# Patient Record
Sex: FEMALE | Race: BLACK OR AFRICAN AMERICAN | NOT HISPANIC OR LATINO
[De-identification: names, ages, dates, MRNs, and addresses within clinical notes are randomized per-mention and may not be internally consistent; named-entity substitution may affect disease eponyms.]

---

## 2017-01-06 ENCOUNTER — APPOINTMENT (OUTPATIENT)
Dept: OPHTHALMOLOGY | Facility: CLINIC | Age: 27
End: 2017-01-06

## 2017-03-16 ENCOUNTER — APPOINTMENT (OUTPATIENT)
Dept: OPHTHALMOLOGY | Facility: CLINIC | Age: 27
End: 2017-03-16

## 2017-06-15 ENCOUNTER — APPOINTMENT (OUTPATIENT)
Dept: OPHTHALMOLOGY | Facility: CLINIC | Age: 27
End: 2017-06-15

## 2017-07-20 ENCOUNTER — APPOINTMENT (OUTPATIENT)
Dept: OBGYN | Facility: CLINIC | Age: 27
End: 2017-07-20

## 2017-07-20 VITALS
WEIGHT: 212 LBS | SYSTOLIC BLOOD PRESSURE: 139 MMHG | BODY MASS INDEX: 39.01 KG/M2 | HEART RATE: 77 BPM | DIASTOLIC BLOOD PRESSURE: 85 MMHG | HEIGHT: 62 IN

## 2017-07-24 LAB
C TRACH RRNA SPEC QL NAA+PROBE: NORMAL
N GONORRHOEA RRNA SPEC QL NAA+PROBE: NORMAL
SOURCE AMPLIFICATION: NORMAL

## 2017-08-08 LAB — CYTOLOGY CVX/VAG DOC THIN PREP: NORMAL

## 2017-09-14 ENCOUNTER — ASOB RESULT (OUTPATIENT)
Age: 27
End: 2017-09-14

## 2017-09-14 ENCOUNTER — APPOINTMENT (OUTPATIENT)
Dept: OBGYN | Facility: CLINIC | Age: 27
End: 2017-09-14
Payer: COMMERCIAL

## 2017-09-14 VITALS
HEART RATE: 66 BPM | SYSTOLIC BLOOD PRESSURE: 131 MMHG | WEIGHT: 213.7 LBS | HEIGHT: 62 IN | DIASTOLIC BLOOD PRESSURE: 83 MMHG | BODY MASS INDEX: 39.32 KG/M2

## 2017-09-14 DIAGNOSIS — Z01.419 ENCOUNTER FOR GYNECOLOGICAL EXAMINATION (GENERAL) (ROUTINE) W/OUT ABNORMAL FINDINGS: ICD-10-CM

## 2017-09-14 PROCEDURE — 76817 TRANSVAGINAL US OBSTETRIC: CPT

## 2017-09-14 PROCEDURE — 99213 OFFICE O/P EST LOW 20 MIN: CPT

## 2017-09-15 LAB
ABO + RH PNL BLD: NORMAL
BASOPHILS # BLD AUTO: 0.01 K/UL
BASOPHILS NFR BLD AUTO: 0.2 %
BLD GP AB SCN SERPL QL: NORMAL
CMV IGG SERPL QL: 3.8 U/ML
CMV IGG SERPL-IMP: POSITIVE
CMV IGM SERPL QL: <8 AU/ML
CMV IGM SERPL QL: NEGATIVE
EOSINOPHIL # BLD AUTO: 0 K/UL
EOSINOPHIL NFR BLD AUTO: 0 %
HBV SURFACE AG SER QL: NONREACTIVE
HCT VFR BLD CALC: 34 %
HCV AB SER QL: NONREACTIVE
HCV S/CO RATIO: 0.11 S/CO
HGB BLD-MCNC: 11.1 G/DL
HIV1+2 AB SPEC QL IA.RAPID: NONREACTIVE
IMM GRANULOCYTES NFR BLD AUTO: 0.2 %
LYMPHOCYTES # BLD AUTO: 1.8 K/UL
LYMPHOCYTES NFR BLD AUTO: 33.8 %
MAN DIFF?: NORMAL
MCHC RBC-ENTMCNC: 25.6 PG
MCHC RBC-ENTMCNC: 32.6 GM/DL
MCV RBC AUTO: 78.3 FL
MONOCYTES # BLD AUTO: 0.46 K/UL
MONOCYTES NFR BLD AUTO: 8.6 %
NEUTROPHILS # BLD AUTO: 3.05 K/UL
NEUTROPHILS NFR BLD AUTO: 57.2 %
PLATELET # BLD AUTO: 266 K/UL
RBC # BLD: 4.34 M/UL
RBC # FLD: 13.4 %
RUBV IGG FLD-ACNC: 1.6 INDEX
RUBV IGG SER-IMP: POSITIVE
VZV AB TITR SER: POSITIVE
VZV IGG SER IF-ACNC: 1153 INDEX
WBC # FLD AUTO: 5.33 K/UL

## 2017-09-21 ENCOUNTER — APPOINTMENT (OUTPATIENT)
Dept: OPHTHALMOLOGY | Facility: CLINIC | Age: 27
End: 2017-09-21

## 2017-09-28 LAB
AR GENE MUT ANL BLD/T: NORMAL
B19V IGG SER QL IA: 2.9 INDEX
B19V IGG+IGM SER-IMP: NORMAL
B19V IGG+IGM SER-IMP: POSITIVE
B19V IGM FLD-ACNC: 0.1 INDEX
B19V IGM SER-ACNC: NEGATIVE
BACTERIA UR CULT: NORMAL
CFTR MUT TESTED BLD/T: NORMAL
FMR1 GENE MUT ANL BLD/T: NORMAL
HGB A MFR BLD: 62.9 %
HGB A2 MFR BLD: 3.4 %
HGB F MFR BLD: 1.2 %
HGB FRACT BLD-IMP: NORMAL
HGB S BLD QL SOLY: POSITIVE
HGB S MFR BLD: 32.5 %
LEAD BLD-MCNC: <1 UG/DL
T PALLIDUM AB SER QL IA: NEGATIVE

## 2017-10-17 ENCOUNTER — APPOINTMENT (OUTPATIENT)
Dept: OBGYN | Facility: CLINIC | Age: 27
End: 2017-10-17

## 2017-10-17 ENCOUNTER — APPOINTMENT (OUTPATIENT)
Dept: OBGYN | Facility: CLINIC | Age: 27
End: 2017-10-17
Payer: COMMERCIAL

## 2017-10-17 VITALS
WEIGHT: 211.9 LBS | BODY MASS INDEX: 39 KG/M2 | DIASTOLIC BLOOD PRESSURE: 79 MMHG | HEIGHT: 62 IN | SYSTOLIC BLOOD PRESSURE: 126 MMHG

## 2017-10-17 PROCEDURE — 0501F PRENATAL FLOW SHEET: CPT

## 2017-10-20 ENCOUNTER — APPOINTMENT (OUTPATIENT)
Dept: ANTEPARTUM | Facility: CLINIC | Age: 27
End: 2017-10-20
Payer: COMMERCIAL

## 2017-10-20 ENCOUNTER — ASOB RESULT (OUTPATIENT)
Age: 27
End: 2017-10-20

## 2017-10-20 LAB — BACTERIA UR CULT: NORMAL

## 2017-10-20 PROCEDURE — 76813 OB US NUCHAL MEAS 1 GEST: CPT

## 2017-10-20 PROCEDURE — 76801 OB US < 14 WKS SINGLE FETUS: CPT

## 2017-10-20 PROCEDURE — 36416 COLLJ CAPILLARY BLOOD SPEC: CPT

## 2017-11-21 ENCOUNTER — APPOINTMENT (OUTPATIENT)
Dept: OBGYN | Facility: CLINIC | Age: 27
End: 2017-11-21
Payer: COMMERCIAL

## 2017-11-21 VITALS
SYSTOLIC BLOOD PRESSURE: 124 MMHG | DIASTOLIC BLOOD PRESSURE: 84 MMHG | WEIGHT: 206 LBS | HEIGHT: 62 IN | BODY MASS INDEX: 37.91 KG/M2

## 2017-11-21 LAB
CREAT 24H UR-MCNC: 1.4 G/24 H
CREAT 24H UR-MCNC: 1.4 G/24 H
CREAT ?TM UR-MCNC: 116 MG/DL
CREAT ?TM UR-MCNC: 116 MG/DL
PROT 24H UR-MRATE: 7 MG/DL
PROT ?TM UR-MCNC: 24 HR
PROT ?TM UR-MCNC: 24 HR
PROT UR-MCNC: 88 MG/24 H
SPECIMEN VOL 24H UR: 1250 ML
SPECIMEN VOL 24H UR: 1250 ML

## 2017-11-21 PROCEDURE — 0502F SUBSEQUENT PRENATAL CARE: CPT

## 2017-11-27 LAB
1ST TRIMESTER DATA: NORMAL
2ND TRIMESTER DATA: NORMAL
AFP PNL SERPL: NORMAL
AFP SERPL-ACNC: NORMAL
AFP SERPL-ACNC: NORMAL
B-HCG FREE SERPL-MCNC: NORMAL
BACTERIA UR CULT: NORMAL
CLINICAL BIOCHEMIST REVIEW: NORMAL
FREE BETA HCG 1ST TRIMESTER: NORMAL
INHIBIN A SERPL-MCNC: NORMAL
NOTES NTD: NORMAL
NT: NORMAL
PAPP-A SERPL-ACNC: NORMAL
U ESTRIOL SERPL-SCNC: NORMAL

## 2017-12-05 ENCOUNTER — APPOINTMENT (OUTPATIENT)
Dept: OPHTHALMOLOGY | Facility: CLINIC | Age: 27
End: 2017-12-05
Payer: COMMERCIAL

## 2017-12-05 PROCEDURE — 92083 EXTENDED VISUAL FIELD XM: CPT

## 2017-12-05 PROCEDURE — 92015 DETERMINE REFRACTIVE STATE: CPT

## 2017-12-05 PROCEDURE — 92014 COMPRE OPH EXAM EST PT 1/>: CPT

## 2017-12-08 ENCOUNTER — ASOB RESULT (OUTPATIENT)
Age: 27
End: 2017-12-08

## 2017-12-08 ENCOUNTER — APPOINTMENT (OUTPATIENT)
Dept: ANTEPARTUM | Facility: CLINIC | Age: 27
End: 2017-12-08
Payer: MEDICAID

## 2017-12-08 PROCEDURE — 76811 OB US DETAILED SNGL FETUS: CPT

## 2017-12-20 ENCOUNTER — APPOINTMENT (OUTPATIENT)
Dept: OBGYN | Facility: CLINIC | Age: 27
End: 2017-12-20

## 2017-12-20 VITALS
SYSTOLIC BLOOD PRESSURE: 118 MMHG | WEIGHT: 207 LBS | BODY MASS INDEX: 38.09 KG/M2 | HEIGHT: 62 IN | DIASTOLIC BLOOD PRESSURE: 79 MMHG

## 2017-12-27 LAB — BACTERIA UR CULT: NORMAL

## 2018-01-12 ENCOUNTER — ASOB RESULT (OUTPATIENT)
Age: 28
End: 2018-01-12

## 2018-01-12 ENCOUNTER — APPOINTMENT (OUTPATIENT)
Dept: ANTEPARTUM | Facility: CLINIC | Age: 28
End: 2018-01-12
Payer: MEDICAID

## 2018-01-12 PROCEDURE — 76816 OB US FOLLOW-UP PER FETUS: CPT

## 2018-01-25 ENCOUNTER — APPOINTMENT (OUTPATIENT)
Dept: OBGYN | Facility: CLINIC | Age: 28
End: 2018-01-25
Payer: MEDICAID

## 2018-01-25 VITALS
DIASTOLIC BLOOD PRESSURE: 72 MMHG | BODY MASS INDEX: 39.09 KG/M2 | HEIGHT: 62 IN | WEIGHT: 212.44 LBS | SYSTOLIC BLOOD PRESSURE: 118 MMHG

## 2018-01-25 PROCEDURE — 0502F SUBSEQUENT PRENATAL CARE: CPT

## 2018-01-27 LAB
BACTERIA UR CULT: NORMAL
BASOPHILS # BLD AUTO: 0.01 K/UL
BASOPHILS NFR BLD AUTO: 0.1 %
EOSINOPHIL # BLD AUTO: 0.01 K/UL
EOSINOPHIL NFR BLD AUTO: 0.1 %
GLUCOSE 1H P 50 G GLC PO SERPL-MCNC: 102 MG/DL
HCT VFR BLD CALC: 33.1 %
HGB BLD-MCNC: 10.3 G/DL
IMM GRANULOCYTES NFR BLD AUTO: 0.3 %
LYMPHOCYTES # BLD AUTO: 1.46 K/UL
LYMPHOCYTES NFR BLD AUTO: 19 %
MAN DIFF?: NORMAL
MCHC RBC-ENTMCNC: 25.6 PG
MCHC RBC-ENTMCNC: 31.1 GM/DL
MCV RBC AUTO: 82.1 FL
MONOCYTES # BLD AUTO: 0.47 K/UL
MONOCYTES NFR BLD AUTO: 6.1 %
NEUTROPHILS # BLD AUTO: 5.72 K/UL
NEUTROPHILS NFR BLD AUTO: 74.4 %
PLATELET # BLD AUTO: 241 K/UL
RBC # BLD: 4.03 M/UL
RBC # FLD: 13.7 %
WBC # FLD AUTO: 7.69 K/UL

## 2018-02-14 ENCOUNTER — ASOB RESULT (OUTPATIENT)
Age: 28
End: 2018-02-14

## 2018-02-14 ENCOUNTER — APPOINTMENT (OUTPATIENT)
Dept: ANTEPARTUM | Facility: CLINIC | Age: 28
End: 2018-02-14
Payer: MEDICAID

## 2018-02-14 PROCEDURE — 76819 FETAL BIOPHYS PROFIL W/O NST: CPT

## 2018-02-14 PROCEDURE — 76816 OB US FOLLOW-UP PER FETUS: CPT

## 2018-02-22 ENCOUNTER — APPOINTMENT (OUTPATIENT)
Dept: OBGYN | Facility: CLINIC | Age: 28
End: 2018-02-22
Payer: MEDICAID

## 2018-02-22 VITALS
WEIGHT: 215 LBS | HEIGHT: 62 IN | SYSTOLIC BLOOD PRESSURE: 109 MMHG | BODY MASS INDEX: 39.56 KG/M2 | DIASTOLIC BLOOD PRESSURE: 69 MMHG

## 2018-02-22 PROCEDURE — 90471 IMMUNIZATION ADMIN: CPT

## 2018-02-22 PROCEDURE — 0502F SUBSEQUENT PRENATAL CARE: CPT

## 2018-02-22 PROCEDURE — 90715 TDAP VACCINE 7 YRS/> IM: CPT

## 2018-03-01 ENCOUNTER — CLINICAL ADVICE (OUTPATIENT)
Age: 28
End: 2018-03-01

## 2018-03-01 LAB — BACTERIA UR CULT: ABNORMAL

## 2018-03-08 ENCOUNTER — APPOINTMENT (OUTPATIENT)
Dept: OBGYN | Facility: CLINIC | Age: 28
End: 2018-03-08

## 2018-03-08 VITALS
SYSTOLIC BLOOD PRESSURE: 122 MMHG | HEIGHT: 62 IN | BODY MASS INDEX: 39.56 KG/M2 | DIASTOLIC BLOOD PRESSURE: 76 MMHG | WEIGHT: 215 LBS

## 2018-03-09 ENCOUNTER — ASOB RESULT (OUTPATIENT)
Age: 28
End: 2018-03-09

## 2018-03-09 ENCOUNTER — APPOINTMENT (OUTPATIENT)
Dept: ANTEPARTUM | Facility: CLINIC | Age: 28
End: 2018-03-09
Payer: MEDICAID

## 2018-03-09 PROCEDURE — 76819 FETAL BIOPHYS PROFIL W/O NST: CPT

## 2018-03-09 PROCEDURE — 76816 OB US FOLLOW-UP PER FETUS: CPT

## 2018-03-11 LAB — BACTERIA UR CULT: NORMAL

## 2018-03-22 ENCOUNTER — APPOINTMENT (OUTPATIENT)
Dept: OBGYN | Facility: CLINIC | Age: 28
End: 2018-03-22
Payer: MEDICAID

## 2018-03-22 VITALS
HEIGHT: 62 IN | DIASTOLIC BLOOD PRESSURE: 72 MMHG | BODY MASS INDEX: 39.75 KG/M2 | SYSTOLIC BLOOD PRESSURE: 132 MMHG | WEIGHT: 216 LBS

## 2018-03-22 PROCEDURE — 0502F SUBSEQUENT PRENATAL CARE: CPT

## 2018-03-27 ENCOUNTER — APPOINTMENT (OUTPATIENT)
Dept: OPHTHALMOLOGY | Facility: CLINIC | Age: 28
End: 2018-03-27
Payer: MEDICAID

## 2018-03-27 PROCEDURE — 92014 COMPRE OPH EXAM EST PT 1/>: CPT

## 2018-03-27 PROCEDURE — 92133 CPTRZD OPH DX IMG PST SGM ON: CPT

## 2018-03-27 PROCEDURE — 92083 EXTENDED VISUAL FIELD XM: CPT

## 2018-04-05 ENCOUNTER — APPOINTMENT (OUTPATIENT)
Dept: OBGYN | Facility: CLINIC | Age: 28
End: 2018-04-05
Payer: MEDICAID

## 2018-04-05 VITALS
WEIGHT: 214.8 LBS | DIASTOLIC BLOOD PRESSURE: 77 MMHG | HEIGHT: 62 IN | BODY MASS INDEX: 39.53 KG/M2 | SYSTOLIC BLOOD PRESSURE: 116 MMHG

## 2018-04-05 PROCEDURE — 0502F SUBSEQUENT PRENATAL CARE: CPT

## 2018-04-06 ENCOUNTER — APPOINTMENT (OUTPATIENT)
Dept: ANTEPARTUM | Facility: CLINIC | Age: 28
End: 2018-04-06
Payer: MEDICAID

## 2018-04-06 ENCOUNTER — APPOINTMENT (OUTPATIENT)
Dept: ANTEPARTUM | Facility: HOSPITAL | Age: 28
End: 2018-04-06

## 2018-04-06 ENCOUNTER — OUTPATIENT (OUTPATIENT)
Dept: OUTPATIENT SERVICES | Facility: HOSPITAL | Age: 28
LOS: 1 days | End: 2018-04-06

## 2018-04-06 ENCOUNTER — ASOB RESULT (OUTPATIENT)
Age: 28
End: 2018-04-06

## 2018-04-06 PROCEDURE — 76818 FETAL BIOPHYS PROFILE W/NST: CPT | Mod: 26

## 2018-04-06 PROCEDURE — 76816 OB US FOLLOW-UP PER FETUS: CPT

## 2018-04-09 LAB
BACTERIA UR CULT: ABNORMAL
GP B STREP DNA SPEC QL NAA+PROBE: DETECTED
GP B STREP DNA SPEC QL NAA+PROBE: NORMAL
SOURCE GBS: NORMAL

## 2018-04-11 ENCOUNTER — APPOINTMENT (OUTPATIENT)
Dept: OBGYN | Facility: CLINIC | Age: 28
End: 2018-04-11

## 2018-04-11 VITALS
HEIGHT: 62 IN | DIASTOLIC BLOOD PRESSURE: 72 MMHG | WEIGHT: 219 LBS | SYSTOLIC BLOOD PRESSURE: 112 MMHG | BODY MASS INDEX: 40.3 KG/M2

## 2018-04-11 VITALS — HEIGHT: 62 IN

## 2018-04-13 ENCOUNTER — APPOINTMENT (OUTPATIENT)
Dept: ANTEPARTUM | Facility: HOSPITAL | Age: 28
End: 2018-04-13
Payer: MEDICAID

## 2018-04-13 ENCOUNTER — ASOB RESULT (OUTPATIENT)
Age: 28
End: 2018-04-13

## 2018-04-13 ENCOUNTER — OUTPATIENT (OUTPATIENT)
Dept: OUTPATIENT SERVICES | Facility: HOSPITAL | Age: 28
LOS: 1 days | End: 2018-04-13

## 2018-04-13 DIAGNOSIS — Z3A.00 WEEKS OF GESTATION OF PREGNANCY NOT SPECIFIED: ICD-10-CM

## 2018-04-13 DIAGNOSIS — O26.899 OTHER SPECIFIED PREGNANCY RELATED CONDITIONS, UNSPECIFIED TRIMESTER: ICD-10-CM

## 2018-04-13 PROCEDURE — 76819 FETAL BIOPHYS PROFIL W/O NST: CPT

## 2018-04-19 ENCOUNTER — APPOINTMENT (OUTPATIENT)
Dept: OBGYN | Facility: CLINIC | Age: 28
End: 2018-04-19
Payer: MEDICAID

## 2018-04-19 VITALS
DIASTOLIC BLOOD PRESSURE: 78 MMHG | BODY MASS INDEX: 40.2 KG/M2 | WEIGHT: 218.44 LBS | SYSTOLIC BLOOD PRESSURE: 118 MMHG | HEIGHT: 62 IN

## 2018-04-19 PROCEDURE — 0502F SUBSEQUENT PRENATAL CARE: CPT

## 2018-04-20 ENCOUNTER — APPOINTMENT (OUTPATIENT)
Dept: ANTEPARTUM | Facility: HOSPITAL | Age: 28
End: 2018-04-20

## 2018-04-20 ENCOUNTER — APPOINTMENT (OUTPATIENT)
Dept: ANTEPARTUM | Facility: CLINIC | Age: 28
End: 2018-04-20

## 2018-04-20 DIAGNOSIS — O99.213 OBESITY COMPLICATING PREGNANCY, THIRD TRIMESTER: ICD-10-CM

## 2018-04-20 DIAGNOSIS — O10.013 PRE-EXISTING ESSENTIAL HYPERTENSION COMPLICATING PREGNANCY, THIRD TRIMESTER: ICD-10-CM

## 2018-04-20 DIAGNOSIS — O40.3XX0 POLYHYDRAMNIOS, THIRD TRIMESTER, NOT APPLICABLE OR UNSPECIFIED: ICD-10-CM

## 2018-04-20 DIAGNOSIS — Z3A.36 36 WEEKS GESTATION OF PREGNANCY: ICD-10-CM

## 2018-04-23 ENCOUNTER — APPOINTMENT (OUTPATIENT)
Dept: ANTEPARTUM | Facility: HOSPITAL | Age: 28
End: 2018-04-23
Payer: MEDICAID

## 2018-04-23 ENCOUNTER — ASOB RESULT (OUTPATIENT)
Age: 28
End: 2018-04-23

## 2018-04-23 LAB — BACTERIA UR CULT: NORMAL

## 2018-04-23 PROCEDURE — 76818 FETAL BIOPHYS PROFILE W/NST: CPT | Mod: 26

## 2018-04-26 ENCOUNTER — APPOINTMENT (OUTPATIENT)
Dept: OBGYN | Facility: CLINIC | Age: 28
End: 2018-04-26
Payer: MEDICAID

## 2018-04-26 VITALS
BODY MASS INDEX: 40.12 KG/M2 | WEIGHT: 218 LBS | SYSTOLIC BLOOD PRESSURE: 134 MMHG | DIASTOLIC BLOOD PRESSURE: 84 MMHG | HEIGHT: 62 IN

## 2018-04-26 PROCEDURE — 0502F SUBSEQUENT PRENATAL CARE: CPT

## 2018-04-27 ENCOUNTER — ASOB RESULT (OUTPATIENT)
Age: 28
End: 2018-04-27

## 2018-04-27 ENCOUNTER — APPOINTMENT (OUTPATIENT)
Dept: ANTEPARTUM | Facility: HOSPITAL | Age: 28
End: 2018-04-27
Payer: MEDICAID

## 2018-04-27 ENCOUNTER — OUTPATIENT (OUTPATIENT)
Dept: OUTPATIENT SERVICES | Facility: HOSPITAL | Age: 28
LOS: 1 days | End: 2018-04-27

## 2018-04-27 PROCEDURE — 76816 OB US FOLLOW-UP PER FETUS: CPT

## 2018-04-27 PROCEDURE — 76818 FETAL BIOPHYS PROFILE W/NST: CPT | Mod: 26

## 2018-05-01 ENCOUNTER — OUTPATIENT (OUTPATIENT)
Dept: OUTPATIENT SERVICES | Facility: HOSPITAL | Age: 28
LOS: 1 days | End: 2018-05-01
Payer: MEDICAID

## 2018-05-01 PROCEDURE — G9001: CPT

## 2018-05-02 ENCOUNTER — APPOINTMENT (OUTPATIENT)
Dept: ANTEPARTUM | Facility: CLINIC | Age: 28
End: 2018-05-02

## 2018-05-02 ENCOUNTER — APPOINTMENT (OUTPATIENT)
Dept: ANTEPARTUM | Facility: HOSPITAL | Age: 28
End: 2018-05-02

## 2018-05-02 ENCOUNTER — OUTPATIENT (OUTPATIENT)
Dept: OUTPATIENT SERVICES | Facility: HOSPITAL | Age: 28
LOS: 1 days | End: 2018-05-02

## 2018-05-02 ENCOUNTER — ASOB RESULT (OUTPATIENT)
Age: 28
End: 2018-05-02

## 2018-05-02 ENCOUNTER — APPOINTMENT (OUTPATIENT)
Dept: ANTEPARTUM | Facility: CLINIC | Age: 28
End: 2018-05-02
Payer: MEDICAID

## 2018-05-02 ENCOUNTER — APPOINTMENT (OUTPATIENT)
Dept: OBGYN | Facility: CLINIC | Age: 28
End: 2018-05-02
Payer: MEDICAID

## 2018-05-02 VITALS
HEIGHT: 62 IN | BODY MASS INDEX: 40.48 KG/M2 | WEIGHT: 220 LBS | SYSTOLIC BLOOD PRESSURE: 144 MMHG | DIASTOLIC BLOOD PRESSURE: 84 MMHG

## 2018-05-02 VITALS — DIASTOLIC BLOOD PRESSURE: 73 MMHG | SYSTOLIC BLOOD PRESSURE: 116 MMHG

## 2018-05-02 PROCEDURE — 76818 FETAL BIOPHYS PROFILE W/NST: CPT | Mod: 26

## 2018-05-02 PROCEDURE — 0502F SUBSEQUENT PRENATAL CARE: CPT

## 2018-05-05 ENCOUNTER — OUTPATIENT (OUTPATIENT)
Dept: OUTPATIENT SERVICES | Facility: HOSPITAL | Age: 28
LOS: 1 days | End: 2018-05-05

## 2018-05-05 DIAGNOSIS — Z3A.00 WEEKS OF GESTATION OF PREGNANCY NOT SPECIFIED: ICD-10-CM

## 2018-05-05 DIAGNOSIS — O26.899 OTHER SPECIFIED PREGNANCY RELATED CONDITIONS, UNSPECIFIED TRIMESTER: ICD-10-CM

## 2018-05-11 ENCOUNTER — OUTPATIENT (OUTPATIENT)
Dept: OUTPATIENT SERVICES | Facility: HOSPITAL | Age: 28
LOS: 1 days | End: 2018-05-11

## 2018-05-11 ENCOUNTER — ASOB RESULT (OUTPATIENT)
Age: 28
End: 2018-05-11

## 2018-05-11 ENCOUNTER — APPOINTMENT (OUTPATIENT)
Dept: ANTEPARTUM | Facility: CLINIC | Age: 28
End: 2018-05-11
Payer: MEDICAID

## 2018-05-11 ENCOUNTER — APPOINTMENT (OUTPATIENT)
Dept: ANTEPARTUM | Facility: HOSPITAL | Age: 28
End: 2018-05-11

## 2018-05-11 PROCEDURE — 99214 OFFICE O/P EST MOD 30 MIN: CPT | Mod: 25

## 2018-05-11 PROCEDURE — 76818 FETAL BIOPHYS PROFILE W/NST: CPT | Mod: 26

## 2018-05-12 ENCOUNTER — INPATIENT (INPATIENT)
Facility: HOSPITAL | Age: 28
LOS: 3 days | Discharge: HOME | End: 2018-05-16
Attending: OBSTETRICS & GYNECOLOGY | Admitting: OBSTETRICS & GYNECOLOGY
Payer: MEDICAID

## 2018-05-12 VITALS — WEIGHT: 220.46 LBS | HEIGHT: 62 IN

## 2018-05-12 DIAGNOSIS — O26.899 OTHER SPECIFIED PREGNANCY RELATED CONDITIONS, UNSPECIFIED TRIMESTER: ICD-10-CM

## 2018-05-12 DIAGNOSIS — Z3A.00 WEEKS OF GESTATION OF PREGNANCY NOT SPECIFIED: ICD-10-CM

## 2018-05-12 LAB
BASOPHILS # BLD AUTO: 0.01 K/UL — SIGNIFICANT CHANGE UP (ref 0–0.2)
BASOPHILS NFR BLD AUTO: 0.1 % — SIGNIFICANT CHANGE UP (ref 0–2)
BLD GP AB SCN SERPL QL: NEGATIVE — SIGNIFICANT CHANGE UP
EOSINOPHIL # BLD AUTO: 0.01 K/UL — SIGNIFICANT CHANGE UP (ref 0–0.5)
EOSINOPHIL NFR BLD AUTO: 0.1 % — SIGNIFICANT CHANGE UP (ref 0–6)
HCT VFR BLD CALC: 33.8 % — LOW (ref 34.5–45)
HGB BLD-MCNC: 11 G/DL — LOW (ref 11.5–15.5)
IMM GRANULOCYTES # BLD AUTO: 0.06 # — SIGNIFICANT CHANGE UP
IMM GRANULOCYTES NFR BLD AUTO: 0.7 % — SIGNIFICANT CHANGE UP (ref 0–1.5)
LYMPHOCYTES # BLD AUTO: 1.26 K/UL — SIGNIFICANT CHANGE UP (ref 1–3.3)
LYMPHOCYTES # BLD AUTO: 15 % — SIGNIFICANT CHANGE UP (ref 13–44)
MCHC RBC-ENTMCNC: 26.4 PG — LOW (ref 27–34)
MCHC RBC-ENTMCNC: 32.5 % — SIGNIFICANT CHANGE UP (ref 32–36)
MCV RBC AUTO: 81.3 FL — SIGNIFICANT CHANGE UP (ref 80–100)
MONOCYTES # BLD AUTO: 0.58 K/UL — SIGNIFICANT CHANGE UP (ref 0–0.9)
MONOCYTES NFR BLD AUTO: 6.9 % — SIGNIFICANT CHANGE UP (ref 2–14)
NEUTROPHILS # BLD AUTO: 6.48 K/UL — SIGNIFICANT CHANGE UP (ref 1.8–7.4)
NEUTROPHILS NFR BLD AUTO: 77.2 % — HIGH (ref 43–77)
NRBC # FLD: 0 — SIGNIFICANT CHANGE UP
PLATELET # BLD AUTO: 204 K/UL — SIGNIFICANT CHANGE UP (ref 150–400)
PMV BLD: 12.4 FL — SIGNIFICANT CHANGE UP (ref 7–13)
RBC # BLD: 4.16 M/UL — SIGNIFICANT CHANGE UP (ref 3.8–5.2)
RBC # FLD: 14.4 % — SIGNIFICANT CHANGE UP (ref 10.3–14.5)
RH IG SCN BLD-IMP: POSITIVE — SIGNIFICANT CHANGE UP
RH IG SCN BLD-IMP: POSITIVE — SIGNIFICANT CHANGE UP
WBC # BLD: 8.4 K/UL — SIGNIFICANT CHANGE UP (ref 3.8–10.5)
WBC # FLD AUTO: 8.4 K/UL — SIGNIFICANT CHANGE UP (ref 3.8–10.5)

## 2018-05-12 RX ORDER — CITRIC ACID/SODIUM CITRATE 300-500 MG
15 SOLUTION, ORAL ORAL EVERY 4 HOURS
Qty: 0 | Refills: 0 | Status: DISCONTINUED | OUTPATIENT
Start: 2018-05-12 | End: 2018-05-13

## 2018-05-12 RX ORDER — SODIUM CHLORIDE 9 MG/ML
1000 INJECTION, SOLUTION INTRAVENOUS ONCE
Qty: 0 | Refills: 0 | Status: DISCONTINUED | OUTPATIENT
Start: 2018-05-12 | End: 2018-05-13

## 2018-05-12 RX ORDER — AMPICILLIN TRIHYDRATE 250 MG
CAPSULE ORAL
Qty: 0 | Refills: 0 | Status: DISCONTINUED | OUTPATIENT
Start: 2018-05-12 | End: 2018-05-13

## 2018-05-12 RX ORDER — OXYTOCIN 10 UNIT/ML
2 VIAL (ML) INJECTION
Qty: 30 | Refills: 0 | Status: DISCONTINUED | OUTPATIENT
Start: 2018-05-12 | End: 2018-05-13

## 2018-05-12 RX ORDER — SODIUM CHLORIDE 9 MG/ML
1000 INJECTION, SOLUTION INTRAVENOUS
Qty: 0 | Refills: 0 | Status: DISCONTINUED | OUTPATIENT
Start: 2018-05-12 | End: 2018-05-13

## 2018-05-12 RX ORDER — AMPICILLIN TRIHYDRATE 250 MG
2 CAPSULE ORAL ONCE
Qty: 0 | Refills: 0 | Status: COMPLETED | OUTPATIENT
Start: 2018-05-12 | End: 2018-05-12

## 2018-05-12 RX ORDER — AMPICILLIN TRIHYDRATE 250 MG
1 CAPSULE ORAL EVERY 4 HOURS
Qty: 0 | Refills: 0 | Status: DISCONTINUED | OUTPATIENT
Start: 2018-05-12 | End: 2018-05-13

## 2018-05-12 RX ORDER — OXYTOCIN 10 UNIT/ML
333.33 VIAL (ML) INJECTION
Qty: 20 | Refills: 0 | Status: COMPLETED | OUTPATIENT
Start: 2018-05-12

## 2018-05-12 RX ORDER — OXYTOCIN 10 UNIT/ML
333.33 VIAL (ML) INJECTION
Qty: 20 | Refills: 0 | Status: DISCONTINUED | OUTPATIENT
Start: 2018-05-12 | End: 2018-05-15

## 2018-05-12 RX ADMIN — SODIUM CHLORIDE 125 MILLILITER(S): 9 INJECTION, SOLUTION INTRAVENOUS at 17:23

## 2018-05-12 RX ADMIN — Medication 216 GRAM(S): at 17:23

## 2018-05-12 RX ADMIN — Medication 108 GRAM(S): at 21:26

## 2018-05-12 RX ADMIN — SODIUM CHLORIDE 125 MILLILITER(S): 9 INJECTION, SOLUTION INTRAVENOUS at 19:59

## 2018-05-12 RX ADMIN — Medication 2 MILLIUNIT(S)/MIN: at 21:26

## 2018-05-13 PROCEDURE — 59510 CESAREAN DELIVERY: CPT | Mod: U9,UB,GC

## 2018-05-13 RX ORDER — FAMOTIDINE 10 MG/ML
20 INJECTION INTRAVENOUS ONCE
Qty: 0 | Refills: 0 | Status: COMPLETED | OUTPATIENT
Start: 2018-05-13 | End: 2018-05-13

## 2018-05-13 RX ORDER — DIPHENHYDRAMINE HCL 50 MG
25 CAPSULE ORAL EVERY 6 HOURS
Qty: 0 | Refills: 0 | Status: DISCONTINUED | OUTPATIENT
Start: 2018-05-13 | End: 2018-05-16

## 2018-05-13 RX ORDER — SODIUM CHLORIDE 9 MG/ML
1000 INJECTION, SOLUTION INTRAVENOUS
Qty: 0 | Refills: 0 | Status: DISCONTINUED | OUTPATIENT
Start: 2018-05-13 | End: 2018-05-15

## 2018-05-13 RX ORDER — DOCUSATE SODIUM 100 MG
100 CAPSULE ORAL
Qty: 0 | Refills: 0 | Status: DISCONTINUED | OUTPATIENT
Start: 2018-05-13 | End: 2018-05-16

## 2018-05-13 RX ORDER — OXYTOCIN 10 UNIT/ML
6 VIAL (ML) INJECTION
Qty: 30 | Refills: 0 | Status: DISCONTINUED | OUTPATIENT
Start: 2018-05-13 | End: 2018-05-13

## 2018-05-13 RX ORDER — KETOROLAC TROMETHAMINE 30 MG/ML
30 SYRINGE (ML) INJECTION EVERY 6 HOURS
Qty: 0 | Refills: 0 | Status: DISCONTINUED | OUTPATIENT
Start: 2018-05-13 | End: 2018-05-14

## 2018-05-13 RX ORDER — ACETAMINOPHEN 500 MG
975 TABLET ORAL EVERY 6 HOURS
Qty: 0 | Refills: 0 | Status: DISCONTINUED | OUTPATIENT
Start: 2018-05-13 | End: 2018-05-16

## 2018-05-13 RX ORDER — IBUPROFEN 200 MG
600 TABLET ORAL EVERY 6 HOURS
Qty: 0 | Refills: 0 | Status: COMPLETED | OUTPATIENT
Start: 2018-05-13 | End: 2019-04-11

## 2018-05-13 RX ORDER — METOCLOPRAMIDE HCL 10 MG
10 TABLET ORAL ONCE
Qty: 0 | Refills: 0 | Status: COMPLETED | OUTPATIENT
Start: 2018-05-13 | End: 2018-05-13

## 2018-05-13 RX ORDER — TETANUS TOXOID, REDUCED DIPHTHERIA TOXOID AND ACELLULAR PERTUSSIS VACCINE, ADSORBED 5; 2.5; 8; 8; 2.5 [IU]/.5ML; [IU]/.5ML; UG/.5ML; UG/.5ML; UG/.5ML
0.5 SUSPENSION INTRAMUSCULAR ONCE
Qty: 0 | Refills: 0 | Status: DISCONTINUED | OUTPATIENT
Start: 2018-05-13 | End: 2018-05-16

## 2018-05-13 RX ORDER — DIPHENOXYLATE HCL/ATROPINE 2.5-.025MG
2 TABLET ORAL ONCE
Qty: 0 | Refills: 0 | Status: DISCONTINUED | OUTPATIENT
Start: 2018-05-13 | End: 2018-05-13

## 2018-05-13 RX ORDER — OXYCODONE HYDROCHLORIDE 5 MG/1
5 TABLET ORAL
Qty: 0 | Refills: 0 | Status: COMPLETED | OUTPATIENT
Start: 2018-05-13 | End: 2018-05-20

## 2018-05-13 RX ORDER — CITRIC ACID/SODIUM CITRATE 300-500 MG
30 SOLUTION, ORAL ORAL ONCE
Qty: 0 | Refills: 0 | Status: COMPLETED | OUTPATIENT
Start: 2018-05-13 | End: 2018-05-13

## 2018-05-13 RX ORDER — OXYTOCIN 10 UNIT/ML
41.67 VIAL (ML) INJECTION
Qty: 20 | Refills: 0 | Status: DISCONTINUED | OUTPATIENT
Start: 2018-05-13 | End: 2018-05-15

## 2018-05-13 RX ORDER — OXYCODONE HYDROCHLORIDE 5 MG/1
5 TABLET ORAL EVERY 4 HOURS
Qty: 0 | Refills: 0 | Status: COMPLETED | OUTPATIENT
Start: 2018-05-13 | End: 2018-05-20

## 2018-05-13 RX ORDER — LANOLIN
1 OINTMENT (GRAM) TOPICAL
Qty: 0 | Refills: 0 | Status: DISCONTINUED | OUTPATIENT
Start: 2018-05-13 | End: 2018-05-16

## 2018-05-13 RX ORDER — FERROUS SULFATE 325(65) MG
325 TABLET ORAL DAILY
Qty: 0 | Refills: 0 | Status: DISCONTINUED | OUTPATIENT
Start: 2018-05-13 | End: 2018-05-15

## 2018-05-13 RX ORDER — SIMETHICONE 80 MG/1
80 TABLET, CHEWABLE ORAL EVERY 4 HOURS
Qty: 0 | Refills: 0 | Status: DISCONTINUED | OUTPATIENT
Start: 2018-05-13 | End: 2018-05-16

## 2018-05-13 RX ORDER — OXYTOCIN 10 UNIT/ML
333.33 VIAL (ML) INJECTION
Qty: 20 | Refills: 0 | Status: DISCONTINUED | OUTPATIENT
Start: 2018-05-13 | End: 2018-05-15

## 2018-05-13 RX ORDER — HEPARIN SODIUM 5000 [USP'U]/ML
5000 INJECTION INTRAVENOUS; SUBCUTANEOUS EVERY 12 HOURS
Qty: 0 | Refills: 0 | Status: DISCONTINUED | OUTPATIENT
Start: 2018-05-13 | End: 2018-05-16

## 2018-05-13 RX ORDER — MORPHINE SULFATE 50 MG/1
4 CAPSULE, EXTENDED RELEASE ORAL ONCE
Qty: 0 | Refills: 0 | Status: DISCONTINUED | OUTPATIENT
Start: 2018-05-13 | End: 2018-05-13

## 2018-05-13 RX ORDER — FENTANYL CITRATE 50 UG/ML
25 INJECTION INTRAVENOUS
Qty: 0 | Refills: 0 | Status: DISCONTINUED | OUTPATIENT
Start: 2018-05-13 | End: 2018-05-15

## 2018-05-13 RX ORDER — GLYCERIN ADULT
1 SUPPOSITORY, RECTAL RECTAL AT BEDTIME
Qty: 0 | Refills: 0 | Status: DISCONTINUED | OUTPATIENT
Start: 2018-05-13 | End: 2018-05-16

## 2018-05-13 RX ADMIN — Medication 2 TABLET(S): at 18:56

## 2018-05-13 RX ADMIN — Medication 30 MILLIGRAM(S): at 20:15

## 2018-05-13 RX ADMIN — MORPHINE SULFATE 4 MILLIGRAM(S): 50 CAPSULE, EXTENDED RELEASE ORAL at 05:21

## 2018-05-13 RX ADMIN — Medication 125 MILLIUNIT(S)/MIN: at 19:04

## 2018-05-13 RX ADMIN — FAMOTIDINE 20 MILLIGRAM(S): 10 INJECTION INTRAVENOUS at 17:36

## 2018-05-13 RX ADMIN — Medication 30 MILLILITER(S): at 17:36

## 2018-05-13 RX ADMIN — MORPHINE SULFATE 4 MILLIGRAM(S): 50 CAPSULE, EXTENDED RELEASE ORAL at 04:23

## 2018-05-13 RX ADMIN — Medication 125 MILLIUNIT(S)/MIN: at 19:06

## 2018-05-13 RX ADMIN — Medication 108 GRAM(S): at 14:05

## 2018-05-13 RX ADMIN — Medication 10 MILLIGRAM(S): at 17:37

## 2018-05-13 RX ADMIN — Medication 108 GRAM(S): at 10:02

## 2018-05-13 RX ADMIN — Medication 30 MILLIGRAM(S): at 20:00

## 2018-05-13 RX ADMIN — Medication 108 GRAM(S): at 05:29

## 2018-05-13 RX ADMIN — Medication 2 MILLIUNIT(S)/MIN: at 11:47

## 2018-05-13 RX ADMIN — SODIUM CHLORIDE 125 MILLILITER(S): 9 INJECTION, SOLUTION INTRAVENOUS at 09:07

## 2018-05-13 RX ADMIN — SODIUM CHLORIDE 125 MILLILITER(S): 9 INJECTION, SOLUTION INTRAVENOUS at 10:02

## 2018-05-13 RX ADMIN — Medication 108 GRAM(S): at 01:29

## 2018-05-14 ENCOUNTER — APPOINTMENT (OUTPATIENT)
Dept: ANTEPARTUM | Facility: CLINIC | Age: 28
End: 2018-05-14

## 2018-05-14 ENCOUNTER — APPOINTMENT (OUTPATIENT)
Dept: ANTEPARTUM | Facility: HOSPITAL | Age: 28
End: 2018-05-14

## 2018-05-14 LAB
BASOPHILS # BLD AUTO: 0.01 K/UL — SIGNIFICANT CHANGE UP (ref 0–0.2)
BASOPHILS NFR BLD AUTO: 0.1 % — SIGNIFICANT CHANGE UP (ref 0–2)
EOSINOPHIL # BLD AUTO: 0.02 K/UL — SIGNIFICANT CHANGE UP (ref 0–0.5)
EOSINOPHIL NFR BLD AUTO: 0.2 % — SIGNIFICANT CHANGE UP (ref 0–6)
HCT VFR BLD CALC: 28.7 % — LOW (ref 34.5–45)
HGB BLD-MCNC: 9.2 G/DL — LOW (ref 11.5–15.5)
IMM GRANULOCYTES # BLD AUTO: 0.06 # — SIGNIFICANT CHANGE UP
IMM GRANULOCYTES NFR BLD AUTO: 0.6 % — SIGNIFICANT CHANGE UP (ref 0–1.5)
LYMPHOCYTES # BLD AUTO: 1.24 K/UL — SIGNIFICANT CHANGE UP (ref 1–3.3)
LYMPHOCYTES # BLD AUTO: 11.8 % — LOW (ref 13–44)
MCHC RBC-ENTMCNC: 25.7 PG — LOW (ref 27–34)
MCHC RBC-ENTMCNC: 32.1 % — SIGNIFICANT CHANGE UP (ref 32–36)
MCV RBC AUTO: 80.2 FL — SIGNIFICANT CHANGE UP (ref 80–100)
MONOCYTES # BLD AUTO: 1 K/UL — HIGH (ref 0–0.9)
MONOCYTES NFR BLD AUTO: 9.5 % — SIGNIFICANT CHANGE UP (ref 2–14)
NEUTROPHILS # BLD AUTO: 8.2 K/UL — HIGH (ref 1.8–7.4)
NEUTROPHILS NFR BLD AUTO: 77.8 % — HIGH (ref 43–77)
NRBC # FLD: 0 — SIGNIFICANT CHANGE UP
PLATELET # BLD AUTO: 177 K/UL — SIGNIFICANT CHANGE UP (ref 150–400)
PMV BLD: 12.6 FL — SIGNIFICANT CHANGE UP (ref 7–13)
RBC # BLD: 3.58 M/UL — LOW (ref 3.8–5.2)
RBC # FLD: 14.1 % — SIGNIFICANT CHANGE UP (ref 10.3–14.5)
T PALLIDUM AB TITR SER: NEGATIVE — SIGNIFICANT CHANGE UP
WBC # BLD: 10.53 K/UL — HIGH (ref 3.8–10.5)
WBC # FLD AUTO: 10.53 K/UL — HIGH (ref 3.8–10.5)

## 2018-05-14 RX ORDER — IBUPROFEN 200 MG
600 TABLET ORAL EVERY 6 HOURS
Qty: 0 | Refills: 0 | Status: DISCONTINUED | OUTPATIENT
Start: 2018-05-14 | End: 2018-05-16

## 2018-05-14 RX ORDER — OXYCODONE HYDROCHLORIDE 5 MG/1
5 TABLET ORAL EVERY 4 HOURS
Qty: 0 | Refills: 0 | Status: DISCONTINUED | OUTPATIENT
Start: 2018-05-14 | End: 2018-05-16

## 2018-05-14 RX ADMIN — Medication 975 MILLIGRAM(S): at 01:00

## 2018-05-14 RX ADMIN — Medication 600 MILLIGRAM(S): at 17:28

## 2018-05-14 RX ADMIN — OXYCODONE HYDROCHLORIDE 5 MILLIGRAM(S): 5 TABLET ORAL at 12:00

## 2018-05-14 RX ADMIN — Medication 975 MILLIGRAM(S): at 06:28

## 2018-05-14 RX ADMIN — Medication 975 MILLIGRAM(S): at 20:00

## 2018-05-14 RX ADMIN — Medication 30 MILLIGRAM(S): at 09:00

## 2018-05-14 RX ADMIN — Medication 1 TABLET(S): at 17:07

## 2018-05-14 RX ADMIN — Medication 600 MILLIGRAM(S): at 16:40

## 2018-05-14 RX ADMIN — OXYCODONE HYDROCHLORIDE 5 MILLIGRAM(S): 5 TABLET ORAL at 14:47

## 2018-05-14 RX ADMIN — HEPARIN SODIUM 5000 UNIT(S): 5000 INJECTION INTRAVENOUS; SUBCUTANEOUS at 12:27

## 2018-05-14 RX ADMIN — Medication 975 MILLIGRAM(S): at 13:07

## 2018-05-14 RX ADMIN — Medication 975 MILLIGRAM(S): at 19:41

## 2018-05-14 RX ADMIN — Medication 600 MILLIGRAM(S): at 23:30

## 2018-05-14 RX ADMIN — OXYCODONE HYDROCHLORIDE 5 MILLIGRAM(S): 5 TABLET ORAL at 19:42

## 2018-05-14 RX ADMIN — Medication 975 MILLIGRAM(S): at 00:07

## 2018-05-14 RX ADMIN — Medication 30 MILLIGRAM(S): at 02:00

## 2018-05-14 RX ADMIN — Medication 30 MILLIGRAM(S): at 01:45

## 2018-05-14 RX ADMIN — SIMETHICONE 80 MILLIGRAM(S): 80 TABLET, CHEWABLE ORAL at 09:00

## 2018-05-14 RX ADMIN — Medication 600 MILLIGRAM(S): at 22:41

## 2018-05-14 RX ADMIN — Medication 325 MILLIGRAM(S): at 17:07

## 2018-05-14 RX ADMIN — Medication 30 MILLIGRAM(S): at 09:43

## 2018-05-14 RX ADMIN — HEPARIN SODIUM 5000 UNIT(S): 5000 INJECTION INTRAVENOUS; SUBCUTANEOUS at 00:06

## 2018-05-14 RX ADMIN — Medication 975 MILLIGRAM(S): at 12:00

## 2018-05-14 NOTE — LACTATION INITIAL EVALUATION - INTERVENTION OUTCOME
verbalizes understanding/nbn  not  sustaining  latch  . rn  aware  .  offer assistance  as  needed  ,  reviiewed  plan  and   alternate  feeding  ,  mother  demonstarted  proper  syringe  feeding  .  reviewed  frequency  of  using  hand  expression  and  pump verbalizes understanding/nbn  not  sustaining  latch  . rn  aware  .  offer assistance  as  needed  ,  reviewed  plan  and   alternate  feeding  ,  mother  demonstarted  proper  syringe  feeding  .  reviewed  frequency  of  using  hand  expression  and  pump

## 2018-05-14 NOTE — PROGRESS NOTE ADULT - PROBLEM SELECTOR PLAN 1
-Encourage Ambulation  -Continue with regular diet  -Heparin, SCDs, and ambulation for DVT ppx  -Discontinue blake  -Check CBC  -Analgesia as needed    Ramo Petersen MD PGY1

## 2018-05-14 NOTE — LACTATION INITIAL EVALUATION - LACTATION INTERVENTIONS
assisted with deep latch and positioning  discussed  signs  of  effective  feeding and  swallowing.  .  reviewed  and  demonstrated  strategies  to bring  out  nipple. with  stim  and  hand  pump.  instructed  to offer both  breast at a feeding ,feed on cue and safe  skin to skin. if  nbn  not  breastfeeding  effectively  hand  express  and  pump  and   give  teaspoons  between  feedings alternative  feeding   method./initiate skin to skin/initiate hand expression routine

## 2018-05-14 NOTE — PROGRESS NOTE ADULT - SUBJECTIVE AND OBJECTIVE BOX
Postpartum Note,  Section   27y year old woman post-operative day 1 from uncomplicated primary LTCS.  No acute events overnight.  Patient has no complaints and pain is well-controlled.  Patient is tolerating regular diet, passing flatus, ambulating, has a blake catheter in place.  Postpartum bleeding is well controlled.    Physical exam:    Vital Signs Last 24 Hrs  T(C): 37 (14 May 2018 05:55), Max: 37.1 (13 May 2018 22:50)  T(F): 98.6 (14 May 2018 05:55), Max: 98.8 (13 May 2018 22:50)  HR: 93 (14 May 2018 05:55) (67 - 93)  BP: 128/66 (14 May 2018 05:55) (120/62 - 135/60)  BP(mean): 81 (13 May 2018 22:00) (77 - 93)  RR: 18 (14 May 2018 05:55) (12 - 23)  SpO2: 99% (14 May 2018 05:55) (98% - 100%)    05-13 @ 07:01  -  05-14 @ 07:00  --------------------------------------------------------  IN: 3375 mL / OUT: 4600 mL / NET: -1225 mL        Gen: NAD  Abdomen: Soft, nontender, no distension , firm uterine fundus at umbilicus.  Incision: Clean, dry, and intact  Pelvic: Normal lochia noted  Ext: No calf tenderness    LABS:                        9.2    10.53 )-----------( 177      ( 14 May 2018 06:10 )             28.7                         11.0   8.40  )-----------( 204      ( 12 May 2018 17:14 )             33.8

## 2018-05-14 NOTE — PROGRESS NOTE ADULT - SUBJECTIVE AND OBJECTIVE BOX
Anesthesia Post-op Note    POD#1 S/P C/S    Patient is ambulating out of bed, no complaints of headache. All questions answered.  No anesthesia related complications.

## 2018-05-14 NOTE — PROGRESS NOTE ADULT - SUBJECTIVE AND OBJECTIVE BOX
Postop Day  __1_ s/p   C- Section    THERAPY:  [  ] Spinal morphine   [ x ] Epidural morphine   [  ] IV PCA Hydromorphone 1 mg/ml    Refer to Charted Pain Scores for Pain Scale score    Sedation Score:	  Alert	    Side Effects:	   None	     Gross Neurological Exam within normal limits    ASSESSMENT/ PLAN     Documentation and Verification of current medications complete.  Change to PRN PO Analgesics

## 2018-05-15 ENCOUNTER — TRANSCRIPTION ENCOUNTER (OUTPATIENT)
Age: 28
End: 2018-05-15

## 2018-05-15 DIAGNOSIS — R69 ILLNESS, UNSPECIFIED: ICD-10-CM

## 2018-05-15 RX ORDER — IBUPROFEN 200 MG
1 TABLET ORAL
Qty: 0 | Refills: 0 | DISCHARGE
Start: 2018-05-15

## 2018-05-15 RX ORDER — ACETAMINOPHEN 500 MG
3 TABLET ORAL
Qty: 0 | Refills: 0 | DISCHARGE
Start: 2018-05-15

## 2018-05-15 RX ORDER — ASCORBIC ACID 60 MG
500 TABLET,CHEWABLE ORAL DAILY
Qty: 0 | Refills: 0 | Status: DISCONTINUED | OUTPATIENT
Start: 2018-05-15 | End: 2018-05-16

## 2018-05-15 RX ORDER — FERROUS SULFATE 325(65) MG
325 TABLET ORAL THREE TIMES A DAY
Qty: 0 | Refills: 0 | Status: DISCONTINUED | OUTPATIENT
Start: 2018-05-15 | End: 2018-05-16

## 2018-05-15 RX ORDER — OXYCODONE HYDROCHLORIDE 5 MG/1
1 TABLET ORAL
Qty: 20 | Refills: 0
Start: 2018-05-15 | End: 2018-05-19

## 2018-05-15 RX ORDER — OXYCODONE HYDROCHLORIDE 5 MG/1
5 TABLET ORAL
Qty: 0 | Refills: 0 | Status: DISCONTINUED | OUTPATIENT
Start: 2018-05-15 | End: 2018-05-16

## 2018-05-15 RX ADMIN — Medication 975 MILLIGRAM(S): at 17:10

## 2018-05-15 RX ADMIN — Medication 975 MILLIGRAM(S): at 09:15

## 2018-05-15 RX ADMIN — Medication 1 TABLET(S): at 12:10

## 2018-05-15 RX ADMIN — OXYCODONE HYDROCHLORIDE 5 MILLIGRAM(S): 5 TABLET ORAL at 22:02

## 2018-05-15 RX ADMIN — Medication 600 MILLIGRAM(S): at 22:15

## 2018-05-15 RX ADMIN — Medication 600 MILLIGRAM(S): at 22:48

## 2018-05-15 RX ADMIN — OXYCODONE HYDROCHLORIDE 5 MILLIGRAM(S): 5 TABLET ORAL at 01:40

## 2018-05-15 RX ADMIN — OXYCODONE HYDROCHLORIDE 5 MILLIGRAM(S): 5 TABLET ORAL at 08:27

## 2018-05-15 RX ADMIN — Medication 975 MILLIGRAM(S): at 01:40

## 2018-05-15 RX ADMIN — Medication 975 MILLIGRAM(S): at 22:48

## 2018-05-15 RX ADMIN — OXYCODONE HYDROCHLORIDE 5 MILLIGRAM(S): 5 TABLET ORAL at 18:00

## 2018-05-15 RX ADMIN — Medication 975 MILLIGRAM(S): at 02:30

## 2018-05-15 RX ADMIN — Medication 975 MILLIGRAM(S): at 08:27

## 2018-05-15 RX ADMIN — HEPARIN SODIUM 5000 UNIT(S): 5000 INJECTION INTRAVENOUS; SUBCUTANEOUS at 00:09

## 2018-05-15 RX ADMIN — Medication 600 MILLIGRAM(S): at 12:10

## 2018-05-15 RX ADMIN — OXYCODONE HYDROCHLORIDE 5 MILLIGRAM(S): 5 TABLET ORAL at 02:30

## 2018-05-15 RX ADMIN — Medication 975 MILLIGRAM(S): at 22:15

## 2018-05-15 RX ADMIN — Medication 975 MILLIGRAM(S): at 18:00

## 2018-05-15 RX ADMIN — OXYCODONE HYDROCHLORIDE 5 MILLIGRAM(S): 5 TABLET ORAL at 22:48

## 2018-05-15 RX ADMIN — Medication 325 MILLIGRAM(S): at 12:10

## 2018-05-15 RX ADMIN — Medication 325 MILLIGRAM(S): at 08:27

## 2018-05-15 RX ADMIN — Medication 500 MILLIGRAM(S): at 08:27

## 2018-05-15 RX ADMIN — HEPARIN SODIUM 5000 UNIT(S): 5000 INJECTION INTRAVENOUS; SUBCUTANEOUS at 12:10

## 2018-05-15 RX ADMIN — Medication 600 MILLIGRAM(S): at 04:45

## 2018-05-15 RX ADMIN — Medication 100 MILLIGRAM(S): at 08:27

## 2018-05-15 RX ADMIN — Medication 600 MILLIGRAM(S): at 13:00

## 2018-05-15 RX ADMIN — Medication 600 MILLIGRAM(S): at 05:30

## 2018-05-15 RX ADMIN — OXYCODONE HYDROCHLORIDE 5 MILLIGRAM(S): 5 TABLET ORAL at 17:10

## 2018-05-15 RX ADMIN — OXYCODONE HYDROCHLORIDE 5 MILLIGRAM(S): 5 TABLET ORAL at 09:15

## 2018-05-15 RX ADMIN — SIMETHICONE 80 MILLIGRAM(S): 80 TABLET, CHEWABLE ORAL at 08:27

## 2018-05-15 NOTE — DISCHARGE NOTE OB - HOSPITAL COURSE
Patient presented with rupture of membranes at 41+ weeks gestational age. Induction of labor with Pitocin, no change from 4 cm x 20 hours. Primary  section for arrest of dilation. Viable female . Uncomplicated postop/postpartum course.

## 2018-05-15 NOTE — DISCHARGE NOTE OB - PLAN OF CARE
full recovery regular diet, regular activity, follow up 6 weeks blood pressure check in office this week

## 2018-05-15 NOTE — DISCHARGE NOTE OB - PATIENT PORTAL LINK FT
You can access the [facility] Patient Portal, offered by [facility], by registering with the following website: [email or web address]

## 2018-05-15 NOTE — DISCHARGE NOTE OB - CARE PLAN
Principal Discharge DX:	 delivery delivered  Goal:	full recovery  Assessment and plan of treatment:	regular diet, regular activity, follow up 6 weeks  Secondary Diagnosis:	Hypertension affecting pregnancy in third trimester  Goal:	blood pressure check in office this week

## 2018-05-15 NOTE — DISCHARGE NOTE OB - MEDICATION SUMMARY - MEDICATIONS TO TAKE
I will START or STAY ON the medications listed below when I get home from the hospital:    acetaminophen 325 mg oral tablet  -- 3 tab(s) by mouth every 6 hours  -- Indication: For  delivery delivered    ibuprofen 600 mg oral tablet  -- 1 tab(s) by mouth every 6 hours  -- Indication: For  delivery delivered    oxyCODONE 5 mg oral tablet  -- 1 tab(s) by mouth every 6 hours, As Needed -Severe Pain (7 - 10) MDD:8 tabs  -- Indication: For  delivery delivered

## 2018-05-15 NOTE — DISCHARGE NOTE OB - CARE PROVIDER_API CALL
Jody Marina (MD), Obstetrics and Gynecology  [facility] [place] [address]  Phone: [phone]  Fax: [phone]

## 2018-05-15 NOTE — PROGRESS NOTE ADULT - SUBJECTIVE AND OBJECTIVE BOX
SUBJECTIVE:    Pain: Controlled    Complaints: None    MILESTONES:    Alert and Oriented x 3  [ x ]  Out of bed/ ambulating. [ x ]  Flatus:   Positive [ x ]  Negative [  ]  Bowel movement  [  ] Positive [  ] Negative   Voiding [x  ] Due to void [  ]   Hciks/Indwelling catheter in place [  ]  Diet: Regular [ x ]  Clears [  ]  NPO [  ]    Infant feeding:  Breast [ X ]   Bottle [  ]  Both [  ]  Feeding related issues and/or concerns:      OBJECTIVE:  T(C): 36.9 (05-15-18 @ 05:23), Max: 37.3 (18 @ 14:09)  HR: 85 (05-15-18 @ 05:23) (80 - 100)  BP: 125/60 (05-15-18 @ 05:23) (122/67 - 129/70)  RR: 18 (05-15-18 @ 05:23) (16 - 18)  SpO2: 99% (05-15-18 @ 05:23) (99% - 100%)  Wt(kg): --                        9.2    10.53 )-----------( 177      ( 14 May 2018 06:10 )             28.7           Blood Type: B Positive    RPR: Negative          MEDICATIONS  (STANDING):  acetaminophen   Tablet. 975 milliGRAM(s) Oral every 6 hours  ascorbic acid 500 milliGRAM(s) Oral daily  diphtheria/tetanus/pertussis (acellular) Vaccine (ADAcel) 0.5 milliLiter(s) IntraMuscular once  ferrous    sulfate 325 milliGRAM(s) Oral three times a day  heparin  Injectable 5000 Unit(s) SubCutaneous every 12 hours  ibuprofen  Tablet 600 milliGRAM(s) Oral every 6 hours  oxyCODONE    IR 5 milliGRAM(s) Oral every 3 hours  prenatal multivitamin 1 Tablet(s) Oral daily    MEDICATIONS  (PRN):  diphenhydrAMINE   Capsule 25 milliGRAM(s) Oral every 6 hours PRN Itching  docusate sodium 100 milliGRAM(s) Oral two times a day PRN Stool Softening  glycerin Suppository - Adult 1 Suppository(s) Rectal at bedtime PRN Constipation  lanolin Ointment 1 Application(s) Topical every 3 hours PRN Sore Nipples  oxyCODONE    IR 5 milliGRAM(s) Oral every 4 hours PRN Severe Pain (7 - 10)  simethicone 80 milliGRAM(s) Chew every 4 hours PRN Gas        ASSESSMENT:    27y     G  1    P  1001       PO Day#  2        Delivery: Primary [ X ]    Repeat [  ]                                         Indication of procedure:  Arrest    Condition: Stable    Past Medical History significant for: HPI: Hx. CHTN,       Current Issues:    Breasts:  Soft [x  ]   Engorged [  ]  Nipples:  Abdomen: Soft [ x ]   Distended [  ] Nontender [  ]     Bowel sounds :  Present [  ]  Absent [  ]   Fundus:  Firm [x  ]  Boggy [  ]    Abdominal incision: Clean, Dry and Intact [x  ]  Staples [  ] Steri Strips [ X ] Dermabond [  ] Sutures [  ]    Patient wearing abdominal binder for support.    Vaginal: Lochia:  Heavy [  ]  Moderate [ x ]   Scant [  ]    Extremities: Edema [ X ] Negative Femi's Sign [X  ] Nontender Omi  [ x ] Positive pedal pulses [  ]    Other relevant physical exam findings:      PLAN:    Plan: Increase ambulation, analgesia PRN and pain medication protocol standing oxycodone, ibuprofen and acetaminophen.    Diet: Regular diet    Continue routine post-operative and postpartum care.  Continue Iron for Anemia.    Discharge Planning [ x ]    For discharge Today  [    ]    Consults:  Social Work [  ]  Lactation [ x ]  Other [         ]

## 2018-05-15 NOTE — PROGRESS NOTE ADULT - ATTENDING COMMENTS
Patient seen and examined by me. Agree with NP assessment and plan. Continue postpartum care.
Patient seen and examined by me.  Agree with above assessment and plan

## 2018-05-15 NOTE — DISCHARGE NOTE OB - MATERIALS PROVIDED
Shaken Baby Prevention Handout/Birth Certificate Instructions/Vaccinations/Guide to Postpartum Care/[facility] Hearing Screen Program/Tdap Vaccination (VIS Pub Date: January 24, 2012)/Back To Sleep Handout

## 2018-05-16 VITALS
HEART RATE: 83 BPM | RESPIRATION RATE: 18 BRPM | OXYGEN SATURATION: 99 % | TEMPERATURE: 99 F | SYSTOLIC BLOOD PRESSURE: 116 MMHG | DIASTOLIC BLOOD PRESSURE: 65 MMHG

## 2018-05-16 DIAGNOSIS — O10.013 PRE-EXISTING ESSENTIAL HYPERTENSION COMPLICATING PREGNANCY, THIRD TRIMESTER: ICD-10-CM

## 2018-05-16 DIAGNOSIS — O99.213 OBESITY COMPLICATING PREGNANCY, THIRD TRIMESTER: ICD-10-CM

## 2018-05-16 DIAGNOSIS — O40.3XX0 POLYHYDRAMNIOS, THIRD TRIMESTER, NOT APPLICABLE OR UNSPECIFIED: ICD-10-CM

## 2018-05-16 RX ADMIN — OXYCODONE HYDROCHLORIDE 5 MILLIGRAM(S): 5 TABLET ORAL at 05:17

## 2018-05-16 RX ADMIN — Medication 975 MILLIGRAM(S): at 05:17

## 2018-05-16 RX ADMIN — Medication 600 MILLIGRAM(S): at 05:17

## 2018-05-16 RX ADMIN — Medication 975 MILLIGRAM(S): at 04:25

## 2018-05-16 RX ADMIN — HEPARIN SODIUM 5000 UNIT(S): 5000 INJECTION INTRAVENOUS; SUBCUTANEOUS at 00:14

## 2018-05-16 RX ADMIN — Medication 600 MILLIGRAM(S): at 04:26

## 2018-05-16 RX ADMIN — OXYCODONE HYDROCHLORIDE 5 MILLIGRAM(S): 5 TABLET ORAL at 04:26

## 2018-05-16 NOTE — PROGRESS NOTE ADULT - SUBJECTIVE AND OBJECTIVE BOX
SUBJECTIVE:    Pain: Controlled    Complaints: None    MILESTONES:    Alert and Oriented x 3  [ x ]  Out of bed/ ambulating. [ x ]  Flatus:   Positive [ x ]  Negative [  ]  Bowel movement  [  ] Positive [  ] Negative   Voiding [x  ] Due to void [  ]   Hicks/Indwelling catheter in place [  ]  Diet: Regular [ x ]  Clears [  ]  NPO [  ]    Infant feeding:  Breast [ X  ]   Bottle [  ]  Both [  ]  Feeding related issues and/or concerns:      OBJECTIVE:  T(C): 37.1 (18 @ 05:45), Max: 37.3 (05-15-18 @ 22:51)  HR: 83 (18 @ 05:45) (83 - 85)  BP: 116/65 (18 @ 05:45) (108/68 - 119/73)  RR: 18 (18 @ 05:45) (18 - 18)  SpO2: 99% (18 @ 05:45) (99% - 100%)  Wt(kg): --          Blood Type: B Positive    RPR: Negative          MEDICATIONS  (STANDING):  acetaminophen   Tablet. 975 milliGRAM(s) Oral every 6 hours  ascorbic acid 500 milliGRAM(s) Oral daily  diphtheria/tetanus/pertussis (acellular) Vaccine (ADAcel) 0.5 milliLiter(s) IntraMuscular once  ferrous    sulfate 325 milliGRAM(s) Oral three times a day  heparin  Injectable 5000 Unit(s) SubCutaneous every 12 hours  ibuprofen  Tablet 600 milliGRAM(s) Oral every 6 hours  oxyCODONE    IR 5 milliGRAM(s) Oral every 3 hours  prenatal multivitamin 1 Tablet(s) Oral daily    MEDICATIONS  (PRN):  diphenhydrAMINE   Capsule 25 milliGRAM(s) Oral every 6 hours PRN Itching  docusate sodium 100 milliGRAM(s) Oral two times a day PRN Stool Softening  glycerin Suppository - Adult 1 Suppository(s) Rectal at bedtime PRN Constipation  lanolin Ointment 1 Application(s) Topical every 3 hours PRN Sore Nipples  oxyCODONE    IR 5 milliGRAM(s) Oral every 4 hours PRN Severe Pain (7 - 10)  simethicone 80 milliGRAM(s) Chew every 4 hours PRN Gas        ASSESSMENT:    27y     G  1    P   1001      PO Day#  3        Delivery: Primary [ X ]    Repeat [  ]                                         Indication of procedure: Arrest    Condition: Stable    Past Medical History significant for: HPI: Hx. CHTN      Current Issues:    Breasts:  Soft [x  ]   Engorged [  ]  Nipples:  Abdomen: Soft [ x ]   Distended [  ] Nontender [  ]     Bowel sounds :  Present [  ]  Absent [  ]   Fundus:  Firm [x  ]  Boggy [  ]    Abdominal incision: Clean, Dry and Intact [x  ]  Staples [  ] Steri Strips [ X ] Dermabond [  ] Sutures [  ]    Patient wearing abdominal binder for support.    Vaginal: Lochia:  Heavy [  ]  Moderate [ x ]   Scant [  ]    Extremities: Edema [X  ] Negative Femi's Sign [ X ] Nontender Omi  [ x ] Positive pedal pulses [  ]    Other relevant physical exam findings:      PLAN:    Plan: Increase ambulation, analgesia PRN and pain medication protocol standing oxycodone, ibuprofen and acetaminophen.    Diet: Regular diet    Continue routine post-operative and postpartum care.     Discharge Planning [ x ]    For discharge Today  [  X  ]    Consults:  Social Work [  ]  Lactation [ x ]  Other [         ]

## 2018-05-21 ENCOUNTER — APPOINTMENT (OUTPATIENT)
Dept: OBGYN | Facility: CLINIC | Age: 28
End: 2018-05-21
Payer: MEDICAID

## 2018-05-21 PROCEDURE — 99211 OFF/OP EST MAY X REQ PHY/QHP: CPT

## 2018-06-08 DIAGNOSIS — O10.013 PRE-EXISTING ESSENTIAL HYPERTENSION COMPLICATING PREGNANCY, THIRD TRIMESTER: ICD-10-CM

## 2018-06-08 DIAGNOSIS — Z3A.40 40 WEEKS GESTATION OF PREGNANCY: ICD-10-CM

## 2018-06-08 DIAGNOSIS — O40.3XX0 POLYHYDRAMNIOS, THIRD TRIMESTER, NOT APPLICABLE OR UNSPECIFIED: ICD-10-CM

## 2018-06-08 DIAGNOSIS — O99.213 OBESITY COMPLICATING PREGNANCY, THIRD TRIMESTER: ICD-10-CM

## 2018-06-13 DIAGNOSIS — Z3A.41 41 WEEKS GESTATION OF PREGNANCY: ICD-10-CM

## 2018-06-13 DIAGNOSIS — O99.213 OBESITY COMPLICATING PREGNANCY, THIRD TRIMESTER: ICD-10-CM

## 2018-06-13 DIAGNOSIS — O10.013 PRE-EXISTING ESSENTIAL HYPERTENSION COMPLICATING PREGNANCY, THIRD TRIMESTER: ICD-10-CM

## 2018-06-15 ENCOUNTER — TRANSCRIPTION ENCOUNTER (OUTPATIENT)
Age: 28
End: 2018-06-15

## 2018-06-26 ENCOUNTER — APPOINTMENT (OUTPATIENT)
Dept: OBGYN | Facility: CLINIC | Age: 28
End: 2018-06-26
Payer: MEDICAID

## 2018-06-26 VITALS
HEART RATE: 51 BPM | BODY MASS INDEX: 36.1 KG/M2 | HEIGHT: 62 IN | DIASTOLIC BLOOD PRESSURE: 87 MMHG | SYSTOLIC BLOOD PRESSURE: 126 MMHG | WEIGHT: 196.19 LBS

## 2018-06-26 DIAGNOSIS — O16.9 UNSPECIFIED MATERNAL HYPERTENSION, UNSPECIFIED TRIMESTER: ICD-10-CM

## 2018-06-26 DIAGNOSIS — Z34.01 ENCOUNTER FOR SUPERVISION OF NORMAL FIRST PREGNANCY, FIRST TRIMESTER: ICD-10-CM

## 2018-06-26 DIAGNOSIS — Z34.02 ENCOUNTER FOR SUPERVISION OF NORMAL FIRST PREGNANCY, SECOND TRIMESTER: ICD-10-CM

## 2018-06-26 PROCEDURE — 0503F POSTPARTUM CARE VISIT: CPT

## 2018-07-18 ENCOUNTER — APPOINTMENT (OUTPATIENT)
Dept: OTOLARYNGOLOGY | Facility: CLINIC | Age: 28
End: 2018-07-18
Payer: MEDICAID

## 2018-07-18 VITALS
RESPIRATION RATE: 15 BRPM | SYSTOLIC BLOOD PRESSURE: 147 MMHG | OXYGEN SATURATION: 98 % | TEMPERATURE: 98.4 F | DIASTOLIC BLOOD PRESSURE: 91 MMHG | HEART RATE: 75 BPM

## 2018-07-18 DIAGNOSIS — J35.8 OTHER CHRONIC DISEASES OF TONSILS AND ADENOIDS: ICD-10-CM

## 2018-07-18 PROCEDURE — 99213 OFFICE O/P EST LOW 20 MIN: CPT

## 2018-07-24 ENCOUNTER — APPOINTMENT (OUTPATIENT)
Dept: OPHTHALMOLOGY | Facility: CLINIC | Age: 28
End: 2018-07-24
Payer: MEDICAID

## 2018-07-24 PROCEDURE — 92083 EXTENDED VISUAL FIELD XM: CPT

## 2018-07-24 PROCEDURE — 92133 CPTRZD OPH DX IMG PST SGM ON: CPT

## 2018-07-24 PROCEDURE — 92012 INTRM OPH EXAM EST PATIENT: CPT

## 2018-09-18 ENCOUNTER — APPOINTMENT (OUTPATIENT)
Dept: OTOLARYNGOLOGY | Facility: CLINIC | Age: 28
End: 2018-09-18
Payer: MEDICAID

## 2018-09-18 PROCEDURE — 99211 OFF/OP EST MAY X REQ PHY/QHP: CPT | Mod: NC

## 2018-10-23 ENCOUNTER — APPOINTMENT (OUTPATIENT)
Dept: OTOLARYNGOLOGY | Facility: CLINIC | Age: 28
End: 2018-10-23
Payer: MEDICAID

## 2018-10-23 DIAGNOSIS — R19.6 HALITOSIS: ICD-10-CM

## 2018-10-23 PROCEDURE — 99211 OFF/OP EST MAY X REQ PHY/QHP: CPT | Mod: NC

## 2018-12-14 ENCOUNTER — APPOINTMENT (OUTPATIENT)
Dept: OBGYN | Facility: CLINIC | Age: 28
End: 2018-12-14
Payer: MEDICAID

## 2018-12-14 VITALS
HEIGHT: 62 IN | SYSTOLIC BLOOD PRESSURE: 136 MMHG | WEIGHT: 207 LBS | BODY MASS INDEX: 38.09 KG/M2 | HEART RATE: 66 BPM | DIASTOLIC BLOOD PRESSURE: 87 MMHG

## 2018-12-14 PROCEDURE — 99213 OFFICE O/P EST LOW 20 MIN: CPT | Mod: 25

## 2018-12-14 PROCEDURE — 99395 PREV VISIT EST AGE 18-39: CPT

## 2018-12-15 LAB
CANDIDA VAG CYTO: NOT DETECTED
G VAGINALIS+PREV SP MTYP VAG QL MICRO: DETECTED
T VAGINALIS VAG QL WET PREP: NOT DETECTED

## 2019-01-08 ENCOUNTER — APPOINTMENT (OUTPATIENT)
Dept: OPHTHALMOLOGY | Facility: CLINIC | Age: 29
End: 2019-01-08
Payer: MEDICAID

## 2019-01-08 PROCEDURE — 92083 EXTENDED VISUAL FIELD XM: CPT

## 2019-01-08 PROCEDURE — 92014 COMPRE OPH EXAM EST PT 1/>: CPT

## 2019-01-08 PROCEDURE — 92133 CPTRZD OPH DX IMG PST SGM ON: CPT

## 2019-01-08 PROCEDURE — ZZZZZ: CPT

## 2019-06-25 ENCOUNTER — APPOINTMENT (OUTPATIENT)
Dept: OBGYN | Facility: CLINIC | Age: 29
End: 2019-06-25

## 2019-07-09 ENCOUNTER — APPOINTMENT (OUTPATIENT)
Dept: OPHTHALMOLOGY | Facility: CLINIC | Age: 29
End: 2019-07-09

## 2019-07-17 ENCOUNTER — EMERGENCY (EMERGENCY)
Facility: HOSPITAL | Age: 29
LOS: 1 days | Discharge: HOME | End: 2019-07-17
Attending: EMERGENCY MEDICINE | Admitting: EMERGENCY MEDICINE
Payer: COMMERCIAL

## 2019-07-17 VITALS
OXYGEN SATURATION: 100 % | DIASTOLIC BLOOD PRESSURE: 94 MMHG | RESPIRATION RATE: 18 BRPM | SYSTOLIC BLOOD PRESSURE: 130 MMHG | HEART RATE: 88 BPM | TEMPERATURE: 98 F

## 2019-07-17 PROCEDURE — 99284 EMERGENCY DEPT VISIT MOD MDM: CPT

## 2019-07-18 VITALS
HEART RATE: 65 BPM | OXYGEN SATURATION: 100 % | RESPIRATION RATE: 16 BRPM | TEMPERATURE: 98 F | SYSTOLIC BLOOD PRESSURE: 130 MMHG | DIASTOLIC BLOOD PRESSURE: 71 MMHG

## 2019-07-18 DIAGNOSIS — Z98.891 HISTORY OF UTERINE SCAR FROM PREVIOUS SURGERY: Chronic | ICD-10-CM

## 2019-07-18 LAB
ALBUMIN SERPL ELPH-MCNC: 3.9 G/DL — SIGNIFICANT CHANGE UP (ref 3.3–5)
ALP SERPL-CCNC: 55 U/L — SIGNIFICANT CHANGE UP (ref 40–120)
ALT FLD-CCNC: 14 U/L — SIGNIFICANT CHANGE UP (ref 4–33)
AMPHET UR-MCNC: NEGATIVE — SIGNIFICANT CHANGE UP
ANION GAP SERPL CALC-SCNC: 11 MMO/L — SIGNIFICANT CHANGE UP (ref 7–14)
APAP SERPL-MCNC: < 15 UG/ML — LOW (ref 15–25)
AST SERPL-CCNC: 18 U/L — SIGNIFICANT CHANGE UP (ref 4–32)
BARBITURATES UR SCN-MCNC: NEGATIVE — SIGNIFICANT CHANGE UP
BASOPHILS # BLD AUTO: 0.02 K/UL — SIGNIFICANT CHANGE UP (ref 0–0.2)
BASOPHILS NFR BLD AUTO: 0.4 % — SIGNIFICANT CHANGE UP (ref 0–2)
BENZODIAZ UR-MCNC: NEGATIVE — SIGNIFICANT CHANGE UP
BILIRUB SERPL-MCNC: 0.3 MG/DL — SIGNIFICANT CHANGE UP (ref 0.2–1.2)
BUN SERPL-MCNC: 9 MG/DL — SIGNIFICANT CHANGE UP (ref 7–23)
CALCIUM SERPL-MCNC: 9 MG/DL — SIGNIFICANT CHANGE UP (ref 8.4–10.5)
CANNABINOIDS UR-MCNC: NEGATIVE — SIGNIFICANT CHANGE UP
CHLORIDE SERPL-SCNC: 103 MMOL/L — SIGNIFICANT CHANGE UP (ref 98–107)
CO2 SERPL-SCNC: 24 MMOL/L — SIGNIFICANT CHANGE UP (ref 22–31)
COCAINE METAB.OTHER UR-MCNC: NEGATIVE — SIGNIFICANT CHANGE UP
CREAT SERPL-MCNC: 0.76 MG/DL — SIGNIFICANT CHANGE UP (ref 0.5–1.3)
EOSINOPHIL # BLD AUTO: 0.03 K/UL — SIGNIFICANT CHANGE UP (ref 0–0.5)
EOSINOPHIL NFR BLD AUTO: 0.5 % — SIGNIFICANT CHANGE UP (ref 0–6)
ETHANOL BLD-MCNC: < 10 MG/DL — SIGNIFICANT CHANGE UP
GLUCOSE SERPL-MCNC: 100 MG/DL — HIGH (ref 70–99)
HCG SERPL-ACNC: < 5 MIU/ML — SIGNIFICANT CHANGE UP
HCT VFR BLD CALC: 38.1 % — SIGNIFICANT CHANGE UP (ref 34.5–45)
HGB BLD-MCNC: 11.9 G/DL — SIGNIFICANT CHANGE UP (ref 11.5–15.5)
IMM GRANULOCYTES NFR BLD AUTO: 0.4 % — SIGNIFICANT CHANGE UP (ref 0–1.5)
LYMPHOCYTES # BLD AUTO: 1.68 K/UL — SIGNIFICANT CHANGE UP (ref 1–3.3)
LYMPHOCYTES # BLD AUTO: 29.9 % — SIGNIFICANT CHANGE UP (ref 13–44)
MCHC RBC-ENTMCNC: 25.6 PG — LOW (ref 27–34)
MCHC RBC-ENTMCNC: 31.2 % — LOW (ref 32–36)
MCV RBC AUTO: 81.9 FL — SIGNIFICANT CHANGE UP (ref 80–100)
METHADONE UR-MCNC: NEGATIVE — SIGNIFICANT CHANGE UP
MONOCYTES # BLD AUTO: 0.47 K/UL — SIGNIFICANT CHANGE UP (ref 0–0.9)
MONOCYTES NFR BLD AUTO: 8.4 % — SIGNIFICANT CHANGE UP (ref 2–14)
NEUTROPHILS # BLD AUTO: 3.4 K/UL — SIGNIFICANT CHANGE UP (ref 1.8–7.4)
NEUTROPHILS NFR BLD AUTO: 60.4 % — SIGNIFICANT CHANGE UP (ref 43–77)
NRBC # FLD: 0 K/UL — SIGNIFICANT CHANGE UP (ref 0–0)
OPIATES UR-MCNC: NEGATIVE — SIGNIFICANT CHANGE UP
OXYCODONE UR-MCNC: NEGATIVE — SIGNIFICANT CHANGE UP
PCP UR-MCNC: NEGATIVE — SIGNIFICANT CHANGE UP
PLATELET # BLD AUTO: 264 K/UL — SIGNIFICANT CHANGE UP (ref 150–400)
PMV BLD: 11.4 FL — SIGNIFICANT CHANGE UP (ref 7–13)
POTASSIUM SERPL-MCNC: 4.2 MMOL/L — SIGNIFICANT CHANGE UP (ref 3.5–5.3)
POTASSIUM SERPL-SCNC: 4.2 MMOL/L — SIGNIFICANT CHANGE UP (ref 3.5–5.3)
PROT SERPL-MCNC: 7.3 G/DL — SIGNIFICANT CHANGE UP (ref 6–8.3)
RBC # BLD: 4.65 M/UL — SIGNIFICANT CHANGE UP (ref 3.8–5.2)
RBC # FLD: 12.6 % — SIGNIFICANT CHANGE UP (ref 10.3–14.5)
SALICYLATES SERPL-MCNC: < 5 MG/DL — LOW (ref 15–30)
SODIUM SERPL-SCNC: 138 MMOL/L — SIGNIFICANT CHANGE UP (ref 135–145)
TSH SERPL-MCNC: 0.93 UIU/ML — SIGNIFICANT CHANGE UP (ref 0.27–4.2)
WBC # BLD: 5.62 K/UL — SIGNIFICANT CHANGE UP (ref 3.8–10.5)
WBC # FLD AUTO: 5.62 K/UL — SIGNIFICANT CHANGE UP (ref 3.8–10.5)

## 2019-07-18 PROCEDURE — 70450 CT HEAD/BRAIN W/O DYE: CPT | Mod: 26

## 2019-07-18 NOTE — ED PROVIDER NOTE - CLINICAL SUMMARY MEDICAL DECISION MAKING FREE TEXT BOX
29yo woman presents with a episode of transient altered mental status with inappropriate response while awake. Concern for metabolic etiology, electrolyte imbalance, neurologic etiology including acute bleed vs non-convulsive seizures, tox, or psychiatric etiology. Will obtain cbc, cmp, tsh, cth, tox screen. Pt is asymptomatic at this time but will continue to reassess with lab and imaging results.

## 2019-07-18 NOTE — ED ADULT NURSE NOTE - OBJECTIVE STATEMENT
pt brought into room 20 . A&OX4 amb self care female presents to the ed today for AMS x30 mins, patient states she was driving and "zoned out." patient breathing even and unlabored speaking in clear and full sentences denies CP/SOB/ 20G iv placed in left ac labs drawn and sent.

## 2019-07-18 NOTE — ED PROVIDER NOTE - NS ED ROS FT
General: no fevers or chills  Head: no headache  Eyes: no vision change  ENT: no nasal discharge/congestion, no sore throat  CV: no chest pain  Resp: no SOB, no cough  GI: no N/V/D, no abdominal pain  : no dysuria  MSK: no joint pain, no muscle aches, no neck pain or back pain  Skin: no new rash or bug bite  Neuro: no focal weakness, no change in sensation

## 2019-07-18 NOTE — ED PROVIDER NOTE - NSFOLLOWUPCLINICS_GEN_ALL_ED_FT
[facility]  Neurology  [address] [place] [address] [address] [place] [address]  Phone: [phone]  Fax:   Follow Up Time:

## 2019-07-18 NOTE — ED PROVIDER NOTE - PHYSICAL EXAMINATION
General: well-appearing woman no acute distress  Head: normocephalic, atraumatic  Eyes: PERRL, EOMI  Mouth: moist mucous membranes  Neck: supple neck, no lymphadenopathy  CV: normal rate and rhythm, no LE edema, peripheral pulses 2+ bilaterally  Respiratory: clear to auscultation bilaterally  Abdomen: soft, nontender, nondistended, bowel sounds present x 4 quadrants  : no suprapubic tenderness, no CVAT  MSK: no Cspine tenderness to palpation, no midline tenderness to palpation  Neuro: alert and oriented x3, CN III-XII intact, speech clear, no pronator drift, no dysmetria, strength 5/5 UE and LE bilaterally, sensation equal and intact bilaterally  Extremities: full ROM of joints, no tenderness to palpation of joints

## 2019-07-18 NOTE — ED PROVIDER NOTE - PROGRESS NOTE DETAILS
Ramila Walters, resident MD: CTH is negative. no abnormal findings on labwork. pt is asymptomatic at this time. will discharge patient home at this time. printed out copies of results for patient to take home. discussed return precautions and need for outpatient follow up.

## 2019-07-18 NOTE — ED PROVIDER NOTE - OBJECTIVE STATEMENT
27yo woman PMH HTN presents with an episode of altered mental status. Pt was driving with niece to work when she began to repeat "well, that's nice" and not responding appropriately to the niece while driving and not responding to requests for her to pull over until she passed 4 exits on the highway. Pt's niece states that she had been driving normally prior to the incident and then slowed down to about 35mph on the highway and was following highway rules and driving normally but not responding properly. Pt denies recollection of the event. She remembers when she pulled over and got out of the car to get into the passenger's seat. Pt's niece states that pt fell asleep and woke up spontaneously and started talking about how they were going to be late for work. She then fell back asleep and the niece called EMS to bring her to the hospital. 29yo woman PMH HTN presents with an episode of altered mental status. Pt was driving with niece to work when she began to repeat "well, that's nice" and not responding appropriately to the niece while driving and not responding to requests for her to pull over until she passed 4 exits on the highway. Pt's niece states that she had been driving normally prior to the incident and then slowed down to about 35mph on the highway and was following highway rules and driving normally but not responding properly. Pt denies recollection of the event. She remembers when she pulled over and got out of the car to get into the passenger's seat. Pt's niece states that pt fell asleep and woke up spontaneously and started talking about how they were going to be late for work. She then fell back asleep and the niece called EMS to bring her to the hospital. She denies recent fevers, cough, runny nose/congestion, n/v/d, abdominal pain, dysuria. No new rash or bug bite, no history of syphilis (hx of hpv), minimal weight gain, no tremors, no history of seizures. She denies any recreational drug use.

## 2019-07-18 NOTE — ED ADULT NURSE NOTE - NSIMPLEMENTINTERV_GEN_ALL_ED
Implemented All Universal Safety Interventions:  [place] to call system. Call bell, personal items and telephone within reach. Instruct patient to call for assistance. Room bathroom lighting operational. Non-slip footwear when patient is off stretcher. Physically safe environment: no spills, clutter or unnecessary equipment. Stretcher in lowest position, wheels locked, appropriate side rails in place.

## 2019-07-18 NOTE — ED ADULT NURSE NOTE - CHIEF COMPLAINT QUOTE
PT C/O period AMS. Pt states was driving with family in car had approximately 30 minute period of amnesia/ confusion. Denies any medical complaints at this time. Pt currently A&Ox4. PMH: HTN not on meds.

## 2019-07-18 NOTE — ED PROVIDER NOTE - ATTENDING CONTRIBUTION TO CARE
pt otherwise healthy with a second episode of AMS - this evening was driving to work when she started repeating a random statement but was able to keep driving in the [place] just at a lower speed - she does not recall the event.  After a few minutes passenger in car got her to pull to side of road but still was not responding to questions.  Then fell asleep and when woke up was almost back to normal.  denies ingestions, HA or fever    Gen: Well appearing in NAD  Head: NC/AT  Neck: trachea midline  Resp:  No distress  Ext: no deformities  Neuro:  A&O non focal  Skin:  Warm and dry as visualized  Psych:  Normal affect and mood    Pt with an episode of transient AMS.  Not consistent with an absence seizure - as was able to still operate a car.  Will get imaging to ensure no mass and lytes and tox.  Will need outpatient follow up

## 2019-07-23 ENCOUNTER — APPOINTMENT (OUTPATIENT)
Dept: OPHTHALMOLOGY | Facility: CLINIC | Age: 29
End: 2019-07-23
Payer: COMMERCIAL

## 2019-07-23 ENCOUNTER — NON-APPOINTMENT (OUTPATIENT)
Age: 29
End: 2019-07-23

## 2019-07-23 PROCEDURE — 92014 COMPRE OPH EXAM EST PT 1/>: CPT

## 2019-07-23 PROCEDURE — 92133 CPTRZD OPH DX IMG PST SGM ON: CPT

## 2019-07-23 PROCEDURE — 92083 EXTENDED VISUAL FIELD XM: CPT

## 2019-08-01 ENCOUNTER — NON-APPOINTMENT (OUTPATIENT)
Age: 29
End: 2019-08-01

## 2019-08-01 ENCOUNTER — APPOINTMENT (OUTPATIENT)
Dept: OPHTHALMOLOGY | Facility: CLINIC | Age: 29
End: 2019-08-01
Payer: COMMERCIAL

## 2019-08-01 PROCEDURE — 92014 COMPRE OPH EXAM EST PT 1/>: CPT

## 2019-08-07 ENCOUNTER — APPOINTMENT (OUTPATIENT)
Dept: OPHTHALMOLOGY | Facility: CLINIC | Age: 29
End: 2019-08-07

## 2019-08-08 ENCOUNTER — APPOINTMENT (OUTPATIENT)
Dept: OBGYN | Facility: CLINIC | Age: 29
End: 2019-08-08
Payer: COMMERCIAL

## 2019-08-08 VITALS
DIASTOLIC BLOOD PRESSURE: 82 MMHG | HEIGHT: 62 IN | SYSTOLIC BLOOD PRESSURE: 117 MMHG | WEIGHT: 220.19 LBS | BODY MASS INDEX: 40.52 KG/M2

## 2019-08-08 DIAGNOSIS — L75.0 BROMHIDROSIS: ICD-10-CM

## 2019-08-08 PROCEDURE — 99395 PREV VISIT EST AGE 18-39: CPT

## 2019-08-08 RX ORDER — FLUCONAZOLE 150 MG/1
150 TABLET ORAL
Qty: 1 | Refills: 1 | Status: DISCONTINUED | COMMUNITY
Start: 2018-12-14 | End: 2019-08-08

## 2019-08-08 RX ORDER — ASCORBIC ACID, CHOLECALCIFEROL, .ALPHA.-TOCOPHEROL ACETATE, DL-, PYRIDOXINE, FOLIC ACID, CYANOCOBALAMIN, CALCIUM, FERROUS FUMARATE, MAGNESIUM, DOCONEXENT 85; 200; 10; 25; 1; 12; 140; 27; 45; 300 [IU]/1; [IU]/1; [IU]/1; [IU]/1; MG/1; UG/1; MG/1; MG/1; MG/1; MG/1
27-0.6-0.4-3 CAPSULE, GELATIN COATED ORAL
Refills: 0 | Status: DISCONTINUED | COMMUNITY
End: 2019-08-08

## 2019-08-08 NOTE — HISTORY OF PRESENT ILLNESS
[Last Pap ___] : Last cervical pap smear was [unfilled] [Sexually Active] : is sexually active [Monogamous] : is monogamous [Contraception] : does not use contraception

## 2019-08-13 LAB
BACTERIA UR CULT: NORMAL
C TRACH RRNA SPEC QL NAA+PROBE: NOT DETECTED
N GONORRHOEA RRNA SPEC QL NAA+PROBE: NOT DETECTED
SOURCE AMPLIFICATION: NORMAL

## 2019-08-21 ENCOUNTER — EMERGENCY (EMERGENCY)
Facility: HOSPITAL | Age: 29
LOS: 1 days | Discharge: HOME | End: 2019-08-21
Attending: STUDENT IN AN ORGANIZED HEALTH CARE EDUCATION/TRAINING PROGRAM | Admitting: STUDENT IN AN ORGANIZED HEALTH CARE EDUCATION/TRAINING PROGRAM
Payer: COMMERCIAL

## 2019-08-21 VITALS
OXYGEN SATURATION: 95 % | DIASTOLIC BLOOD PRESSURE: 85 MMHG | TEMPERATURE: 99 F | SYSTOLIC BLOOD PRESSURE: 157 MMHG | RESPIRATION RATE: 18 BRPM | HEART RATE: 97 BPM

## 2019-08-21 VITALS
SYSTOLIC BLOOD PRESSURE: 145 MMHG | HEART RATE: 88 BPM | DIASTOLIC BLOOD PRESSURE: 78 MMHG | TEMPERATURE: 98 F | RESPIRATION RATE: 18 BRPM | OXYGEN SATURATION: 100 %

## 2019-08-21 DIAGNOSIS — Z98.891 HISTORY OF UTERINE SCAR FROM PREVIOUS SURGERY: Chronic | ICD-10-CM

## 2019-08-21 PROCEDURE — 99284 EMERGENCY DEPT VISIT MOD MDM: CPT

## 2019-08-21 NOTE — ED ADULT NURSE NOTE - NSIMPLEMENTINTERV_GEN_ALL_ED
Implemented All Universal Safety Interventions:  [place] to call system. Call bell, personal items and telephone within reach. Instruct patient to call for assistance. Room bathroom lighting operational. Non-slip footwear when patient is off stretcher. Physically safe environment: no spills, clutter or unnecessary equipment. Stretcher in lowest position, wheels locked, appropriate side rails in place.

## 2019-08-21 NOTE — ED PROVIDER NOTE - PHYSICAL EXAMINATION
GENERAL: no acute distress, non-toxic appearing  HEAD: normocephalic, atraumatic  HEENT: normal conjunctiva, oral mucosa moist, neck supple  CARDIAC: regular rate and rhythm, normal S1 and S2,  no appreciable murmurs  PULM: clear to ascultation bilaterally, no crackles, rales, rhonchi, or wheezing  GI: abdomen nondistended, soft, nontender, no guarding or rebound tenderness  NEURO: alert and oriented x 3, normal speech, PERRLA, EOMI, CN 3-12 grossly intact, finger to nose intact, no pronator drift, 5/5 strength in all extremities  MSK: no visible deformities, no peripheral edema, calf tenderness/redness/swelling  SKIN: no visible rashes, dry, well-perfused  PSYCH: appropriate mood and affect GENERAL: no acute distress, non-toxic appearing  HEAD: normocephalic, atraumatic  HEENT: normal conjunctiva, oral mucosa moist, neck supple  CARDIAC: regular rate and rhythm, normal S1 and S2,  no appreciable murmurs  PULM: clear to ascultation bilaterally, no crackles, rales, rhonchi, or wheezing  GI: abdomen nondistended, soft, nontender, no guarding or rebound tenderness  NEURO: alert and oriented x 3, normal speech, PERRLA, EOMI, CN 2-12 grossly intact, finger to nose intact, no pronator drift, 5/5 strength in all extremities  MSK: no visible deformities, no peripheral edema, calf tenderness/redness/swelling  SKIN: no visible rashes, dry, well-perfused  PSYCH: appropriate mood and affect

## 2019-08-21 NOTE — ED PROVIDER NOTE - CLINICAL SUMMARY MEDICAL DECISION MAKING FREE TEXT BOX
28F PMH of recently diagnosed seizure disorder put on Keppra last Tuesday presents to ED feeling more "fuzzy" lately and having approx 15 second episodes where she loses consciousness (but still has muscular tone) and repeats words with a 2-3 hour period of fatigue afterwards. Denies any jerking movements, tongue biting, loss of bladder control, recent trauma to the head, headaches, URI symptoms, fevers, chills, chest pain, palp, lightheadedness, ab pain, n/v/d, dysuria, leg swelling. Vitals reassuring on presentation. Exam is benign. Keppra level and contact neurologist (Dr. Klein [phone]). Reassess.

## 2019-08-21 NOTE — ED PROVIDER NOTE - ATTENDING CONTRIBUTION TO CARE
27 yo F pmh htn, seizure disorder (dx several months ago and on keppra 750mg bid) presents for evaluation after seizure yesterday. Seizure witnessed and without trauma. Patient denies any symptoms since  and feels well at time of eval. 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    GEN: no acute respiratory distress. nontoxic, speaking comfortably in full sentences, ambulating with steady gait.  HEENT: NCAT. face symmetrical. PERRL 4mm, EOMI, MMM, oropharynx wnl.  Neck: no JVD, trachea midline, no LAD  CV: RRR. +S1S2, no murmur. cap refill <2 sec  Chest: CTA B/l. no wheezing, rales, rhonchi.   ABD: +BS, soft, non distended, non tender.   MSK: No clubbing, cyanosis, edema. FROM of all extremities.   Neuro: AOOX3.  CN 2-12 intact; Sensation intact, motor 5/5 throughout. no ataxia  SKIN: No rash    seizure disorder with breakthrough seizures. no other symptoms at this time. no signs of trauma. patient only recently started on AEDs. Spoke with pt's neurologist. will increase keppra and patient to f/u out-patient. Return precautions were discussed with patient at bedside and patient expressed understanding. stable for dc

## 2019-08-21 NOTE — ED ADULT NURSE NOTE - OBJECTIVE STATEMENT
Pt a&ox3 hx of seizures on keppra c/o increased frequency of seizures over the past week, most recent seizure was yesterday, pt states she feels "foggy", denies headache/dizziness, no cp/discomfort, no sob, no n/v/d, afebrile, skin is cool dry and intact, awaiting md [place], will continue to monitor.

## 2019-08-21 NOTE — ED ADULT TRIAGE NOTE - CHIEF COMPLAINT QUOTE
Patient with a h/o seizures c/o having a seizure on Saturday and again yesterday. C/o her head feeling " foggy" and she cannot focus. She takes Keppra and did not miss a dose.

## 2019-08-21 NOTE — ED PROVIDER NOTE - NS ED ROS FT
GENERAL: no fever, chills  HEENT: no cough, congestion, odynophagia, dysphagia  CARDIAC: no chest pain, palpitations, lightheadedness  PULM: no dyspnea, wheezing   GI: no abdominal pain, nausea, vomiting, diarrhea, constipation, melena, hematochezia  : no urinary dysuria, frequency, incontinence, hematuria  NEURO: + loss of consciousness for about 15 seconds while repeating words with a 2-3 hours of fatigue afterwards, no headache, motor weakness, sensory changes, jerking movements, tongue biting, urination during episodes  MSK: no joint or muscle pain  SKIN: no rashes, hives  HEME: no active bleeding, bruising

## 2019-08-21 NOTE — ED PROVIDER NOTE - PROGRESS NOTE DETAILS
Spoke with Dr. Klein, pts neurologist and he said not to draw keppra levels and to tell patient to double her dose to 1500mg Keppra 2x/day and have her schedule appointment with their office for next week. He also told her to watch out for mood changes as a side effect. Spoke with patient about this and she understands the plan.

## 2019-08-21 NOTE — ED PROVIDER NOTE - OBJECTIVE STATEMENT
Patient is a 28 year olf female with PMH of HTN (not on treatment) and recently diagnosed seizure disorder (started Keppra 250mg 2x/day) presenting to the ED with a seizure episode she had yesterday at about 1pm. She also endorses feeling more "fuzzy" than usual as well. As per her , these episodes last about 15 seconds where patient repeats the same word over and over and then she feels tired after for few hours. Yesterday's episode occurred while she was on the phone with her mother and patient states next thing she knew she woke up in her bed and felt tired. Patient states her first episode occurred on June 28th and all episodes are similar, with her repeating one word and not remembering the event. Denies any jerking movements, tongue biting, or urination during these events. Patient states she got a CT scan and MRI of her head recently which were all normal and an EEG in the hospital which was normal but she then got a 48 hour EEG which showed seizure activity while sleeping so the doctor prescribed 250mg Keppra 2x/day which she started last Tuesday. No trauma to the head recently or during these episodes. LMP August 1st-5th. Denies any recent fevers, chills, headache, URI symptoms, chest pain, palpitations, lightheadedness, SOB, abd pain, n/v/d, dysuria, leg swelling. Patient is a 28 year olf female with PMH of HTN (not on treatment) and recently diagnosed seizure disorder (started Keppra 750mg 2x/day) presenting to the ED with a seizure episode she had yesterday at about 1pm. She also endorses feeling more "fuzzy" than usual as well. As per her , these episodes last about 15 seconds where patient repeats the same word over and over and then she feels tired after for few hours. Yesterday's episode occurred while she was on the phone with her mother and patient states next thing she knew she woke up in her bed and felt tired. Patient states her first episode occurred on June 28th and all episodes are similar, with her repeating one word and not remembering the event. Denies any jerking movements, tongue biting, or urination during these events. Patient states she got a CT scan and MRI of her head recently which were all normal and an EEG in the hospital which was normal but she then got a 48 hour EEG which showed seizure activity while sleeping so the doctor prescribed 750mg Keppra 2x/day which she started last Tuesday. No trauma to the head recently or during these episodes. LMP August 1st-5th. Denies any recent fevers, chills, headache, URI symptoms, chest pain, palpitations, lightheadedness, SOB, abd pain, n/v/d, dysuria, leg swelling.

## 2019-08-21 NOTE — ED PROVIDER NOTE - NSFOLLOWUPINSTRUCTIONS_ED_ALL_ED_FT
Your diagnosis: Seizure    Discharge instructions:    - Please follow up with your Neurologist next week.    - Please increase your dose of Keppra to 1500mg twice a day (as per Dr. Klein).      - Be sure to return to the ED if you develop new or worsening symptoms. Specific signs and symptoms to be vigilant of: fever or chills, chest pain, difficulty breathing, palpitations, loss of consciousness, headache, vision changes, slurred speech, difficulty swallowing or drooling, facial droop, weakness in the arms or legs, numbness or tingling, abdominal pain, nausea or vomiting, diarrhea, constipation, blood in the stool or urine, pain on urination, difficulty urinating or any other distressing symptoms.

## 2019-09-09 ENCOUNTER — NON-APPOINTMENT (OUTPATIENT)
Age: 29
End: 2019-09-09

## 2019-09-09 ENCOUNTER — APPOINTMENT (OUTPATIENT)
Dept: OPHTHALMOLOGY | Facility: CLINIC | Age: 29
End: 2019-09-09
Payer: COMMERCIAL

## 2019-09-09 PROCEDURE — 92012 INTRM OPH EXAM EST PATIENT: CPT

## 2019-09-11 NOTE — ED ADULT TRIAGE NOTE - CHIEF COMPLAINT QUOTE
PT C/O period AMS. Pt states was driving with family in car had approximately 30 minute period of amnesia/ confusion. Denies any medical complaints at this time. Pt currently A&Ox4. PMH: HTN not on meds. Statement Selected

## 2019-11-19 NOTE — ED PROVIDER NOTE - NSFOLLOWUPINSTRUCTIONS_ED_ALL_ED_FT
CHIEF COMPLAINT: Patient presents with:  Diabetes  Hypertension        HPI:     Tonio Randhawa is a 79year old male presents for HTN, HL, and DM2. Diabetes   He presents for his follow-up diabetic visit. He has type 2 diabetes mellitus.  H current episode started more than 1 year ago. The problem is controlled. Exacerbating diseases include diabetes and obesity. There are no known factors aggravating his hyperlipidemia.  Pertinent negatives include no chest pain, leg pain, myalgias or shortne Respiratory: Negative for cough and shortness of breath. Cardiovascular: Negative for chest pain, palpitations and leg swelling. Gastrointestinal: Negative for nausea, vomiting, abdominal pain and diarrhea. Musculoskeletal: Negative for myalgias. 0. 7    • Total Protein 10/15/2019 7.9    • Albumin 10/15/2019 3.8    • Globulin  10/15/2019 4.1    • A/G Ratio 10/15/2019 0.9*   • FASTING 10/15/2019 Yes    • HgbA1C 10/15/2019 7.1*   • Estimated Average Glucose 10/15/2019 157*   • Cholesterol, Total 10/15 determine Hep B immunity status before giving Hep B vaccine series.    - HEPATITIS B PROFILE;  Future      Meds This Visit:  Requested Prescriptions     Signed Prescriptions Disp Refills   • atorvastatin 20 MG Oral Tab 90 tablet 1     Sig: TAKE 1 TABLET BY Please follow up with your primary care provider in the next few days. You should also follow up with a neurologist.    Continue all other home medications as prescribed.    Return to the Emergency Department immediately if you have slurred speech, difficulty swallow, change in vision, weakness in arms or legs, confusion, or any new and concerning symptoms or concerns.    Please read all attached.

## 2019-12-30 ENCOUNTER — OTHER (OUTPATIENT)
Age: 29
End: 2019-12-30

## 2020-01-09 ENCOUNTER — APPOINTMENT (OUTPATIENT)
Dept: OPHTHALMOLOGY | Facility: CLINIC | Age: 30
End: 2020-01-09

## 2020-01-23 ENCOUNTER — APPOINTMENT (OUTPATIENT)
Dept: OBGYN | Facility: CLINIC | Age: 30
End: 2020-01-23
Payer: COMMERCIAL

## 2020-01-23 VITALS
BODY MASS INDEX: 39.93 KG/M2 | SYSTOLIC BLOOD PRESSURE: 140 MMHG | DIASTOLIC BLOOD PRESSURE: 92 MMHG | WEIGHT: 217 LBS | HEIGHT: 62 IN | HEART RATE: 96 BPM

## 2020-01-23 DIAGNOSIS — N63.0 UNSPECIFIED LUMP IN UNSPECIFIED BREAST: ICD-10-CM

## 2020-01-23 PROCEDURE — 99213 OFFICE O/P EST LOW 20 MIN: CPT

## 2020-01-24 ENCOUNTER — TRANSCRIPTION ENCOUNTER (OUTPATIENT)
Age: 30
End: 2020-01-24

## 2020-02-04 ENCOUNTER — NON-APPOINTMENT (OUTPATIENT)
Age: 30
End: 2020-02-04

## 2020-02-04 ENCOUNTER — APPOINTMENT (OUTPATIENT)
Dept: OPHTHALMOLOGY | Facility: CLINIC | Age: 30
End: 2020-02-04
Payer: COMMERCIAL

## 2020-02-04 PROCEDURE — 92133 CPTRZD OPH DX IMG PST SGM ON: CPT

## 2020-02-04 PROCEDURE — 92012 INTRM OPH EXAM EST PATIENT: CPT

## 2020-02-04 PROCEDURE — 92083 EXTENDED VISUAL FIELD XM: CPT

## 2020-02-04 PROCEDURE — 92015 DETERMINE REFRACTIVE STATE: CPT

## 2020-03-15 LAB
C TRACH RRNA SPEC QL NAA+PROBE: NOT DETECTED
N GONORRHOEA RRNA SPEC QL NAA+PROBE: NOT DETECTED
SOURCE AMPLIFICATION: NORMAL

## 2020-03-15 NOTE — PHYSICAL EXAM
[Examination Of The Breasts] : a normal appearance [No Masses] : no breast masses were palpable [Axillary Lymph Nodes Enlarged Bilaterally] : no enlarged nodes [Soft] : soft [Tender] : non tender [Distended] : not distended [H/Smegaly] : no hepatosplenomegaly [Normal] : uterus [Discharge] : a  ~M vaginal discharge was present [Moderate] : moderate [Foul Smelling] : not foul smelling [Clear] : clear [Watery] : watery

## 2020-04-02 ENCOUNTER — APPOINTMENT (OUTPATIENT)
Dept: OPHTHALMOLOGY | Facility: CLINIC | Age: 30
End: 2020-04-02

## 2020-08-11 ENCOUNTER — APPOINTMENT (OUTPATIENT)
Dept: OBGYN | Facility: CLINIC | Age: 30
End: 2020-08-11
Payer: COMMERCIAL

## 2020-08-11 VITALS
SYSTOLIC BLOOD PRESSURE: 132 MMHG | HEART RATE: 81 BPM | BODY MASS INDEX: 39.2 KG/M2 | HEIGHT: 62 IN | DIASTOLIC BLOOD PRESSURE: 85 MMHG | WEIGHT: 213 LBS | TEMPERATURE: 98.3 F

## 2020-08-11 DIAGNOSIS — N89.8 OTHER SPECIFIED NONINFLAMMATORY DISORDERS OF VAGINA: ICD-10-CM

## 2020-08-11 DIAGNOSIS — R82.90 UNSPECIFIED ABNORMAL FINDINGS IN URINE: ICD-10-CM

## 2020-08-11 DIAGNOSIS — A63.0 ANOGENITAL (VENEREAL) WARTS: ICD-10-CM

## 2020-08-11 PROCEDURE — 99395 PREV VISIT EST AGE 18-39: CPT

## 2020-08-11 PROCEDURE — 56605 BIOPSY OF VULVA/PERINEUM: CPT

## 2020-08-18 LAB
BACTERIA UR CULT: ABNORMAL
C TRACH RRNA SPEC QL NAA+PROBE: NOT DETECTED
CANDIDA VAG CYTO: NOT DETECTED
CYTOLOGY CVX/VAG DOC THIN PREP: ABNORMAL
G VAGINALIS+PREV SP MTYP VAG QL MICRO: NOT DETECTED
N GONORRHOEA RRNA SPEC QL NAA+PROBE: NOT DETECTED
SOURCE AMPLIFICATION: NORMAL
T VAGINALIS VAG QL WET PREP: NOT DETECTED

## 2020-08-18 RX ORDER — SULFAMETHOXAZOLE AND TRIMETHOPRIM 800; 160 MG/1; MG/1
800-160 TABLET ORAL TWICE DAILY
Qty: 6 | Refills: 0 | Status: COMPLETED | COMMUNITY
Start: 2020-08-18 | End: 2020-08-21

## 2020-08-21 LAB — CORE LAB BIOPSY: NORMAL

## 2021-04-12 ENCOUNTER — APPOINTMENT (OUTPATIENT)
Dept: OPHTHALMOLOGY | Facility: CLINIC | Age: 31
End: 2021-04-12

## 2021-07-10 ENCOUNTER — EMERGENCY (EMERGENCY)
Facility: HOSPITAL | Age: 31
LOS: 1 days | Discharge: HOME | End: 2021-07-10
Attending: EMERGENCY MEDICINE | Admitting: EMERGENCY MEDICINE
Payer: MEDICAID

## 2021-07-10 VITALS
HEART RATE: 108 BPM | HEIGHT: 62 IN | RESPIRATION RATE: 18 BRPM | OXYGEN SATURATION: 96 % | DIASTOLIC BLOOD PRESSURE: 72 MMHG | TEMPERATURE: 99 F | SYSTOLIC BLOOD PRESSURE: 147 MMHG

## 2021-07-10 VITALS
RESPIRATION RATE: 16 BRPM | SYSTOLIC BLOOD PRESSURE: 127 MMHG | HEART RATE: 72 BPM | DIASTOLIC BLOOD PRESSURE: 68 MMHG | TEMPERATURE: 99 F | OXYGEN SATURATION: 100 %

## 2021-07-10 DIAGNOSIS — Z98.891 HISTORY OF UTERINE SCAR FROM PREVIOUS SURGERY: Chronic | ICD-10-CM

## 2021-07-10 LAB
ALBUMIN SERPL ELPH-MCNC: 4.4 G/DL — SIGNIFICANT CHANGE UP (ref 3.3–5)
ALP SERPL-CCNC: 64 U/L — SIGNIFICANT CHANGE UP (ref 40–120)
ALT FLD-CCNC: 11 U/L — SIGNIFICANT CHANGE UP (ref 4–33)
ANION GAP SERPL CALC-SCNC: 12 MMOL/L — SIGNIFICANT CHANGE UP (ref 7–14)
APPEARANCE UR: CLEAR — SIGNIFICANT CHANGE UP
AST SERPL-CCNC: 14 U/L — SIGNIFICANT CHANGE UP (ref 4–32)
BASOPHILS # BLD AUTO: 0.01 K/UL — SIGNIFICANT CHANGE UP (ref 0–0.2)
BASOPHILS NFR BLD AUTO: 0.2 % — SIGNIFICANT CHANGE UP (ref 0–2)
BILIRUB SERPL-MCNC: 0.2 MG/DL — SIGNIFICANT CHANGE UP (ref 0.2–1.2)
BILIRUB UR-MCNC: NEGATIVE — SIGNIFICANT CHANGE UP
BLOOD GAS VENOUS COMPREHENSIVE RESULT: SIGNIFICANT CHANGE UP
BLOOD GAS VENOUS COMPREHENSIVE RESULT: SIGNIFICANT CHANGE UP
BUN SERPL-MCNC: 12 MG/DL — SIGNIFICANT CHANGE UP (ref 7–23)
CALCIUM SERPL-MCNC: 9.3 MG/DL — SIGNIFICANT CHANGE UP (ref 8.4–10.5)
CHLORIDE SERPL-SCNC: 103 MMOL/L — SIGNIFICANT CHANGE UP (ref 98–107)
CO2 SERPL-SCNC: 23 MMOL/L — SIGNIFICANT CHANGE UP (ref 22–31)
COLOR SPEC: SIGNIFICANT CHANGE UP
CREAT SERPL-MCNC: 0.76 MG/DL — SIGNIFICANT CHANGE UP (ref 0.5–1.3)
DIFF PNL FLD: NEGATIVE — SIGNIFICANT CHANGE UP
EOSINOPHIL # BLD AUTO: 0.01 K/UL — SIGNIFICANT CHANGE UP (ref 0–0.5)
EOSINOPHIL NFR BLD AUTO: 0.2 % — SIGNIFICANT CHANGE UP (ref 0–6)
GLUCOSE SERPL-MCNC: 94 MG/DL — SIGNIFICANT CHANGE UP (ref 70–99)
GLUCOSE UR QL: NEGATIVE — SIGNIFICANT CHANGE UP
HCG SERPL-ACNC: <5 MIU/ML — SIGNIFICANT CHANGE UP
HCT VFR BLD CALC: 38.4 % — SIGNIFICANT CHANGE UP (ref 34.5–45)
HGB BLD-MCNC: 11.9 G/DL — SIGNIFICANT CHANGE UP (ref 11.5–15.5)
IANC: 3.56 K/UL — SIGNIFICANT CHANGE UP (ref 1.5–8.5)
IMM GRANULOCYTES NFR BLD AUTO: 0.2 % — SIGNIFICANT CHANGE UP (ref 0–1.5)
KETONES UR-MCNC: ABNORMAL
LEUKOCYTE ESTERASE UR-ACNC: NEGATIVE — SIGNIFICANT CHANGE UP
LYMPHOCYTES # BLD AUTO: 0.93 K/UL — LOW (ref 1–3.3)
LYMPHOCYTES # BLD AUTO: 19.2 % — SIGNIFICANT CHANGE UP (ref 13–44)
MAGNESIUM SERPL-MCNC: 2.1 MG/DL — SIGNIFICANT CHANGE UP (ref 1.6–2.6)
MCHC RBC-ENTMCNC: 23.5 PG — LOW (ref 27–34)
MCHC RBC-ENTMCNC: 31 GM/DL — LOW (ref 32–36)
MCV RBC AUTO: 75.7 FL — LOW (ref 80–100)
MONOCYTES # BLD AUTO: 0.33 K/UL — SIGNIFICANT CHANGE UP (ref 0–0.9)
MONOCYTES NFR BLD AUTO: 6.8 % — SIGNIFICANT CHANGE UP (ref 2–14)
NEUTROPHILS # BLD AUTO: 3.56 K/UL — SIGNIFICANT CHANGE UP (ref 1.8–7.4)
NEUTROPHILS NFR BLD AUTO: 73.4 % — SIGNIFICANT CHANGE UP (ref 43–77)
NITRITE UR-MCNC: NEGATIVE — SIGNIFICANT CHANGE UP
NRBC # BLD: 0 /100 WBCS — SIGNIFICANT CHANGE UP
NRBC # FLD: 0 K/UL — SIGNIFICANT CHANGE UP
PH UR: 6 — SIGNIFICANT CHANGE UP (ref 5–8)
PLATELET # BLD AUTO: 315 K/UL — SIGNIFICANT CHANGE UP (ref 150–400)
POTASSIUM SERPL-MCNC: 4.5 MMOL/L — SIGNIFICANT CHANGE UP (ref 3.5–5.3)
POTASSIUM SERPL-SCNC: 4.5 MMOL/L — SIGNIFICANT CHANGE UP (ref 3.5–5.3)
PROT SERPL-MCNC: 7.5 G/DL — SIGNIFICANT CHANGE UP (ref 6–8.3)
PROT UR-MCNC: ABNORMAL
RBC # BLD: 5.07 M/UL — SIGNIFICANT CHANGE UP (ref 3.8–5.2)
RBC # FLD: 13 % — SIGNIFICANT CHANGE UP (ref 10.3–14.5)
SODIUM SERPL-SCNC: 138 MMOL/L — SIGNIFICANT CHANGE UP (ref 135–145)
SP GR SPEC: 1.02 — SIGNIFICANT CHANGE UP (ref 1.01–1.02)
UROBILINOGEN FLD QL: SIGNIFICANT CHANGE UP
VALPROATE SERPL-MCNC: <3.15 UG/ML — LOW (ref 50–100)
WBC # BLD: 4.85 K/UL — SIGNIFICANT CHANGE UP (ref 3.8–10.5)
WBC # FLD AUTO: 4.85 K/UL — SIGNIFICANT CHANGE UP (ref 3.8–10.5)

## 2021-07-10 PROCEDURE — 70450 CT HEAD/BRAIN W/O DYE: CPT | Mod: 26

## 2021-07-10 PROCEDURE — 99284 EMERGENCY DEPT VISIT MOD MDM: CPT

## 2021-07-10 PROCEDURE — 71046 X-RAY EXAM CHEST 2 VIEWS: CPT | Mod: 26

## 2021-07-10 RX ORDER — SODIUM CHLORIDE 9 MG/ML
1000 INJECTION INTRAMUSCULAR; INTRAVENOUS; SUBCUTANEOUS ONCE
Refills: 0 | Status: COMPLETED | OUTPATIENT
Start: 2021-07-10 | End: 2021-07-10

## 2021-07-10 RX ORDER — LEVETIRACETAM 250 MG/1
1500 TABLET, FILM COATED ORAL ONCE
Refills: 0 | Status: COMPLETED | OUTPATIENT
Start: 2021-07-10 | End: 2021-07-10

## 2021-07-10 RX ORDER — METOCLOPRAMIDE HCL 10 MG
10 TABLET ORAL ONCE
Refills: 0 | Status: COMPLETED | OUTPATIENT
Start: 2021-07-10 | End: 2021-07-10

## 2021-07-10 RX ORDER — KETOROLAC TROMETHAMINE 30 MG/ML
15 SYRINGE (ML) INJECTION ONCE
Refills: 0 | Status: DISCONTINUED | OUTPATIENT
Start: 2021-07-10 | End: 2021-07-10

## 2021-07-10 RX ADMIN — Medication 15 MILLIGRAM(S): at 03:27

## 2021-07-10 RX ADMIN — Medication 15 MILLIGRAM(S): at 03:37

## 2021-07-10 RX ADMIN — LEVETIRACETAM 400 MILLIGRAM(S): 250 TABLET, FILM COATED ORAL at 03:43

## 2021-07-10 RX ADMIN — Medication 10 MILLIGRAM(S): at 02:52

## 2021-07-10 RX ADMIN — SODIUM CHLORIDE 1000 MILLILITER(S): 9 INJECTION INTRAMUSCULAR; INTRAVENOUS; SUBCUTANEOUS at 02:53

## 2021-07-10 NOTE — ED ADULT NURSE NOTE - OBJECTIVE STATEMENT
stopped seizure medications 2-3 weeks ago due to thinking she did not need them anymore reports 2 seizures today one including incontinence falling from chair unknown head trauma, c/o headache and feeling sluggish "lately"

## 2021-07-10 NOTE — ED PROVIDER NOTE - NSFOLLOWUPINSTRUCTIONS_ED_ALL_ED_FT
The patient is a 30y Female who has a past medical and surgery history of   ,   PAST MEDICAL & SURGICAL HISTORY:  HTN      PTED with   Vital Signs Last 24 Hrs  T(C): --  T(F): --  HR: --  BP: --  BP(mean): --  RR: --  SpO2: --  PE: as described; my additions and exceptions are noted in the chart    DATA:  EKG: pending at time of evaluation  LAB: Pending at time of evaluation            IMPRESSION/RISK:  Dx=  Differential includes but not limited to conditions listed in order of most possible first:   Consideration include  Plan you were seen in the emergency department today after having a Seizure.   you had blood work and imaging performed with no emergent findings.   you were given a dose of your keppra.   please restart taking your keppra and lamotrigine as previous applied.   please follow up with your neurologist in the next several days.    A seizure is abnormal electrical activity in the brain; the specific cause may or may not be found. Prior to a seizure you may experience a warning sensation (aura) that may include fear, nausea, dizziness, and visual changes such as flashing lights of spots. Common symptoms during the seizure may include an altered mental status, rhythmic jerking movements, drooling, grunting, loss of bladder or bowel control, or tongue biting. After a seizure, you may feel confused and sleepy.     Do not swim, drive, operate machinery, or engage in any risky activity during which a seizure could cause further injury to you or others. Teach friends and family what to do if you HAVE a seizure which includes laying you on the ground with your head on a cushion and turning you to the side to keep your breathing passages clear in case of vomiting.    SEEK IMMEDIATE MEDICAL CARE IF YOU HAVE ANY OF THE FOLLOWING SYMPTOMS: seizure lasting over 5 minutes, not waking up or persistent altered mental status after the seizure, or more frequent or worsening seizures.

## 2021-07-10 NOTE — ED PROVIDER NOTE - OBJECTIVE STATEMENT
31yo F Hx of seizure disorder presenting with complaints of seizures. Pt on 1500 keppra BID and 200mg lamotrigine daily, reports that she stopped taking these medications about 3 weeks ago because she hadnt been having seizures and thought she didn't need to take them anymore. states that she usually has partial complex Sz. around 8PM while in the shower had a Sz which her  was present for and recorded. then again around midnight while sitting on the couch she had another seizure, she fell off the couch and was incontinent of urine. now with a headache at this time. no f/c, cp, sob, cough, nausea, vomiting, diarrhea, abd pain, urinary symptoms. +photophobia, phonophobia. no tinnitus, numbness, tingling or weakness. Follows with a private neurologist, Dr. Klein, which she saw earlier this year.

## 2021-07-10 NOTE — ED PROVIDER NOTE - PROGRESS NOTE DETAILS
paola abdi pgy3: pt with improvement in her headache at this time, resting comfortably. given a dose of keppra here. lactate cleared after fluid. discussed continuing her prescribed anti-epileptic medications, will follow up with her neurologist. will discharge home at this time.

## 2021-07-10 NOTE — ED ADULT TRIAGE NOTE - TEMPERATURE IN FAHRENHEIT (DEGREES F)
"[facility] Hospital Medicine  Progress Note    Patient Name: Ming Harrington  MRN: [id number]  Patient Class: OP- Observation   Admission Date: 2/25/2020  Length of Stay: 0 days  Attending Physician: Burt Betts MD  Primary Care Provider: Vazquez Barajas MD        Subjective:     Principal Problem:COPD with acute exacerbation        HPI:  Ming Harrington is a 55 year old male with PMHx of COPD, HTN, Anxiety, Opioid dependence who presented to the ED with complaints of shortness of breath and cough. Patient reports that about 2 weeks ago he began to take care of his son who was sick with an unknown respiratory infection.  Patient reports that since then he has been progressively feeling worse.  He has had an increase in a productive cough, productive of thick green sputum.  He is also having increasingly worse shortness of breath.  Patient was then is gone was with past 2 weeks.  Patient has not taken any of his medications over the past few days, as he has felt too sick to go to the pharmacy to get his medications.  Patient has been admitted multiple times in the past for COPD exacerbations.  He is also endorsing significant myalgias throughout his entire body, rhinorrhea, facial pain and pressure.  He reports that the sputum that he is producing so thick that is clogging up my throat and is altering his voice.    In the ED, the patient received multiple DuoNeb as and an hour long breathing treatment without any benefit.  Patient also received 125 mg IV Solu-Medrol.  Despite treatment, patient remained satting in the mid 80s. CXR showed "Bibasilar subsegmental scarring versus atelectasis without focal consolidation." Patient was flu negative. BNP and troponin wnl.     Overview/Hospital Course:  No notes on file    Interval History: Patient seen and examined this morning. No acute events overnight. Patient continues to endorse shortness of breath and coughing. Patient denies any improvement in " 98.9 his symptoms but patient was sitting in bed without oxygen in no apparent distress. Continues to report significant amount of mucus. Patient reports urinary retention and constipation.     Review of Systems   Constitutional: Positive for chills and fatigue. Negative for appetite change, fever and unexpected weight change.   HENT: Positive for sinus pressure and sinus pain. Negative for rhinorrhea, sneezing and sore throat.    Respiratory: Positive for cough, shortness of breath and wheezing.    Cardiovascular: Negative for chest pain and palpitations.   Gastrointestinal: Positive for abdominal distention and constipation. Negative for abdominal pain, blood in stool, diarrhea, nausea and vomiting.   Genitourinary: Positive for difficulty urinating.   Musculoskeletal: Positive for myalgias. Negative for arthralgias and back pain.   Skin: Negative for color change and rash.   Neurological: Positive for weakness. Negative for dizziness, light-headedness, numbness and headaches.   Psychiatric/Behavioral: Negative for behavioral problems, confusion, decreased concentration and sleep disturbance. The patient is nervous/anxious.      Objective:     Vital Signs (Most Recent):  Temp: 96.3 °F (35.7 °C) (02/26/20 0407)  Pulse: 70 (02/26/20 0429)  Resp: 20 (02/26/20 0429)  BP: (!) 142/90 (02/26/20 0407)  SpO2: (!) 94 % (02/26/20 0429) Vital Signs (24h Range):  Temp:  [96.3 °F (35.7 °C)-98.1 °F (36.7 °C)] 96.3 °F (35.7 °C)  Pulse:  [] 70  Resp:  [13-54] 20  SpO2:  [84 %-96 %] 94 %  BP: (135-190)/() 142/90     Weight: 117.6 kg (259 lb 4.2 oz)  Body mass index is 39.42 kg/m².    Intake/Output Summary (Last 24 hours) at 2/26/2020 0739  Last data filed at 2/26/2020 0600  Gross per 24 hour   Intake 250 ml   Output 950 ml   Net -700 ml      Physical Exam   Constitutional: He is oriented to person, place, and time. He appears distressed.   HENT:   Head: Normocephalic.   Eyes: Pupils are equal, round, and reactive to light.  Conjunctivae and EOM are normal.   Neck: Normal range of motion. Neck supple. No JVD present.   Cardiovascular: Normal rate, regular rhythm, normal heart sounds and intact distal pulses.   No murmur heard.  Pulmonary/Chest: No respiratory distress. He has wheezes (improved from presentation).   Poor air entry bilaterally   Abdominal: Soft. Bowel sounds are normal. He exhibits no distension and no mass. There is no tenderness.   Musculoskeletal: Normal range of motion. He exhibits no edema (reports improved).   Neurological: He is alert and oriented to person, place, and time.   Skin: Skin is warm and dry. Capillary refill takes less than 2 seconds.   Psychiatric: He has a normal mood and affect. His behavior is normal.       Significant Labs:   CBC:   Recent Labs   Lab 02/25/20  1200 02/26/20  0659   WBC 6.85 6.31   HGB 13.5* 12.7*   HCT 43.4 41.1    133*     CMP:   Recent Labs   Lab 02/25/20  1200 02/26/20  0659    139   K 3.9 4.9    102   CO2 27 29    159*   BUN 9 11   CREATININE 1.0 1.1   CALCIUM 8.8 8.9   PROT 7.8 7.4   ALBUMIN 3.3* 3.0*   BILITOT 0.5 0.4   ALKPHOS 86 80   AST 23 18   ALT 14 11   ANIONGAP 10 8   EGFRNONAA >60 >60       Significant Imaging: I have reviewed and interpreted all pertinent imaging results/findings within the past 24 hours.      Assessment/Plan:      * COPD with acute exacerbation  Patient is presenting with complaints of shortness of breath and productive cough progressively worsening over the previous 2 weeks. Cough is productive of thick, green sputum. Patient reports a sick contact at home. No fever while in ED. Last PFT's about 3 years ago showed FEV/FVC of 51%. Patient reports that he has just taken his Advair on the day of admission, but no other medications. Diffuse myalgias but pain is not more severe in one leg compared to the other and no unilateral leg swelling or color change so DVT/PE unlikely. Patient desating in bed in ED after multiple  breathing treatments.   - Duonebs q4hrs   - Fluticasone-vilanterol daily   - Ceftriaxone   - Azithromycin   - Acapella and chest physiotherapy q6hrs   - Prednisone 40mg daily   - Mucinex   - Patient reports that he isn't feeling any better but is no longer de-sating and sounds better on exam   - Will continue to monitor breathing status and saturations      HTN (hypertension)  Patient with a history of hypertension on clonidine .3mg TID at home. Reports that he has not taken his home medications for at least the past 5-7 days.    - Will restart home clonidine   - Consider adding second agent if necessary   - BP this /90   - Continue to monitor BP      Generalized anxiety disorder  Patient with a reported history of JUVENCIO. He was clearly significantly anxious during interview in the ED. Has a history of clonazepam use BID but has not taken any for the past week.    - Not giving clonazepam as patient has been off for about one week at this point   - Hydroxyzine PRN for anxiety   - Restarted home lexapro   - Will continue to monitor      Opiate dependence  Patient reports history of IVDU, last use >1 year ago. On methadone maintenance therapy, reportedly 100mg daily. UDS positive for methadone and opiates. Denied any other recreational drug use.   - restart home methadone   - Continue to monitor      Tobacco dependence  Patient with about 15-20 pack year smoking history. Endorsed increase in smoking lately.   - Nicotine patch   - Recommend cessation        VTE Risk Mitigation (From admission, onward)         Ordered     enoxaparin injection 40 mg  Daily      02/25/20 1541     IP VTE HIGH RISK PATIENT  Once      02/25/20 1541     Place GENOVEVA hose  Until discontinued      02/25/20 1420                Coleman Vargas MD PGY-I  Department of Hospital Medicine   [facility]

## 2021-07-10 NOTE — ED PROVIDER NOTE - PATIENT PORTAL LINK FT
You can access the FollowMyHealth Patient Portal offered by [facility] by registering at the following website: [email or web address]. By joining [facility]’s FollowMyHealth portal, you will also be able to view your health information using other applications (apps) compatible with our system.

## 2021-07-10 NOTE — ED PROVIDER NOTE - CLINICAL SUMMARY MEDICAL DECISION MAKING FREE TEXT BOX
31yo F Hx of Sz disorder, on keppra 1500 BID and lamotrigine 200mg qd, which she hasn't been taking for the past 3 weeks. Follows with private neurologist Dr. Klein. had two seizures this evening. now with complaints of HA. alert and oriented x3, with occipital HA. neurologically intact. Sz likely related to medication noncompliance, however will eval for possible infection. will obtain labs, CT, keppra level. likely load with keppra and dc with instructions to continue her medications and follow up with private neurologist.

## 2021-07-10 NOTE — ED ADULT TRIAGE NOTE - CHIEF COMPLAINT QUOTE
Pt st" I have hx of seizures and had 2 today, I have partial complex siezures." As per EMT" Ambulatory at scene. . Family reports she is not compliant with meds...Keppra and lamotrigen Last siezure at 8pm and this siezure seemed to last longer and more aggressive. + Incontinence   " Pt arrives axo3 c/o headache

## 2021-07-10 NOTE — ED ADULT NURSE NOTE - NSIMPLEMENTINTERV_GEN_ALL_ED
Implemented All Universal Safety Interventions:  [place] to call system. Call bell, personal items and telephone within reach. Instruct patient to call for assistance. Room bathroom lighting operational. Non-slip footwear when patient is off stretcher. Physically safe environment: no spills, clutter or unnecessary equipment. Stretcher in lowest position, wheels locked, appropriate side rails in place.

## 2021-07-10 NOTE — ED PROVIDER NOTE - SEVERE SEPSIS ALERT DETAILS
pt with elevated lactate s/p Sz, cleared with fluids. no infectious symptoms. sepsis not supported by clinical picture at this time.

## 2021-07-10 NOTE — ED PROVIDER NOTE - ATTENDING CONTRIBUTION TO CARE
The patient is a 30y Female who has a past medical and surgery history of   ,   PAST MEDICAL & SURGICAL HISTORY:  HTN      PTED with   Vital Signs Last 24 Hrs  T(C): --  T(F): --  HR: --  BP: --  BP(mean): --  RR: --  SpO2: --  PE: as described; my additions and exceptions are noted in the chart    DATA:  EKG: pending at time of evaluation  LAB: Pending at time of evaluation            IMPRESSION/RISK:  Dx=  Differential includes but not limited to conditions listed in order of most possible first:   Consideration include  Plan

## 2021-08-12 ENCOUNTER — APPOINTMENT (OUTPATIENT)
Dept: OBGYN | Facility: CLINIC | Age: 31
End: 2021-08-12

## 2021-10-25 NOTE — PATIENT PROFILE OB - BABY A: CORD CHARACTERISTICS, DELIVERY
Spoke with Mr Leon reminding him of Mrs Leon Eddy.  Edward confirmed Mrs Leon would be here   no anomalies noted

## 2021-11-17 ENCOUNTER — NON-APPOINTMENT (OUTPATIENT)
Age: 31
End: 2021-11-17

## 2021-11-17 ENCOUNTER — APPOINTMENT (OUTPATIENT)
Dept: OPHTHALMOLOGY | Facility: CLINIC | Age: 31
End: 2021-11-17
Payer: COMMERCIAL

## 2021-11-17 PROCEDURE — 92015 DETERMINE REFRACTIVE STATE: CPT

## 2021-11-17 PROCEDURE — 92014 COMPRE OPH EXAM EST PT 1/>: CPT

## 2021-11-17 PROCEDURE — 92133 CPTRZD OPH DX IMG PST SGM ON: CPT

## 2021-11-17 PROCEDURE — 92083 EXTENDED VISUAL FIELD XM: CPT

## 2021-12-07 ENCOUNTER — APPOINTMENT (OUTPATIENT)
Dept: OBGYN | Facility: CLINIC | Age: 31
End: 2021-12-07
Payer: MEDICARE

## 2021-12-07 VITALS
HEIGHT: 62 IN | SYSTOLIC BLOOD PRESSURE: 132 MMHG | DIASTOLIC BLOOD PRESSURE: 80 MMHG | WEIGHT: 215 LBS | HEART RATE: 82 BPM | BODY MASS INDEX: 39.56 KG/M2

## 2021-12-07 DIAGNOSIS — Z01.419 ENCOUNTER FOR GYNECOLOGICAL EXAMINATION (GENERAL) (ROUTINE) W/OUT ABNORMAL FINDINGS: ICD-10-CM

## 2021-12-07 DIAGNOSIS — R30.0 DYSURIA: ICD-10-CM

## 2021-12-07 LAB
BILIRUB UR QL STRIP: NORMAL
GLUCOSE UR-MCNC: NORMAL
HCG UR QL: 0.2 EU/DL
HCG UR QL: POSITIVE
HGB UR QL STRIP.AUTO: NORMAL
KETONES UR-MCNC: NORMAL
LEUKOCYTE ESTERASE UR QL STRIP: NORMAL
NITRITE UR QL STRIP: NORMAL
PH UR STRIP: 6
PROT UR STRIP-MCNC: NORMAL
QUALITY CONTROL: YES
SP GR UR STRIP: 1.03

## 2021-12-07 PROCEDURE — 99395 PREV VISIT EST AGE 18-39: CPT

## 2021-12-10 LAB — BACTERIA UR CULT: ABNORMAL

## 2021-12-29 ENCOUNTER — APPOINTMENT (OUTPATIENT)
Dept: OBGYN | Facility: CLINIC | Age: 31
End: 2021-12-29
Payer: COMMERCIAL

## 2021-12-29 ENCOUNTER — APPOINTMENT (OUTPATIENT)
Dept: OBGYN | Facility: CLINIC | Age: 31
End: 2021-12-29
Payer: MEDICAID

## 2021-12-29 ENCOUNTER — ASOB RESULT (OUTPATIENT)
Age: 31
End: 2021-12-29

## 2021-12-29 VITALS
HEART RATE: 76 BPM | SYSTOLIC BLOOD PRESSURE: 128 MMHG | BODY MASS INDEX: 40.85 KG/M2 | DIASTOLIC BLOOD PRESSURE: 82 MMHG | HEIGHT: 62 IN | WEIGHT: 222 LBS | TEMPERATURE: 97.9 F

## 2021-12-29 DIAGNOSIS — O36.80X1 PREGNANCY WITH INCONCLUSIVE FETAL VIABILITY, FETUS 1: ICD-10-CM

## 2021-12-29 PROCEDURE — 76817 TRANSVAGINAL US OBSTETRIC: CPT

## 2021-12-29 PROCEDURE — 99213 OFFICE O/P EST LOW 20 MIN: CPT

## 2022-01-01 LAB
ABO + RH PNL BLD: NORMAL
BACTERIA UR CULT: NORMAL
BLD GP AB SCN SERPL QL: NORMAL
HBV SURFACE AG SER QL: NONREACTIVE
HCV AB SER QL: NONREACTIVE
HCV S/CO RATIO: 0.14 S/CO
HIV1+2 AB SPEC QL IA.RAPID: NONREACTIVE
PROGEST SERPL-MCNC: 16.5 NG/ML
T PALLIDUM AB SER QL IA: NEGATIVE

## 2022-01-01 NOTE — PHYSICAL EXAM
[Chaperone Present] : A chaperone was present in the examining room during all aspects of the physical examination [Appropriately responsive] : appropriately responsive [Alert] : alert [No Acute Distress] : no acute distress [No Lymphadenopathy] : no lymphadenopathy [No Murmurs] : no murmurs

## 2022-01-01 NOTE — HISTORY OF PRESENT ILLNESS
[FreeTextEntry1] : 32yo P1 LMP 10/30/21 presents for confirmation of pregnancy.  She denies VB or pain.\par Sonogram today reveals intrauterine gestational sac but no yolk sac or fetal pole

## 2022-01-07 ENCOUNTER — ASOB RESULT (OUTPATIENT)
Age: 32
End: 2022-01-07

## 2022-01-07 ENCOUNTER — APPOINTMENT (OUTPATIENT)
Dept: OBGYN | Facility: CLINIC | Age: 32
End: 2022-01-07
Payer: COMMERCIAL

## 2022-01-07 PROCEDURE — 76817 TRANSVAGINAL US OBSTETRIC: CPT

## 2022-01-25 ENCOUNTER — ASOB RESULT (OUTPATIENT)
Age: 32
End: 2022-01-25

## 2022-01-25 ENCOUNTER — APPOINTMENT (OUTPATIENT)
Dept: OBGYN | Facility: CLINIC | Age: 32
End: 2022-01-25
Payer: COMMERCIAL

## 2022-01-25 PROCEDURE — 76817 TRANSVAGINAL US OBSTETRIC: CPT

## 2022-01-26 ENCOUNTER — TRANSCRIPTION ENCOUNTER (OUTPATIENT)
Age: 32
End: 2022-01-26

## 2022-01-27 ENCOUNTER — APPOINTMENT (OUTPATIENT)
Dept: OBGYN | Facility: CLINIC | Age: 32
End: 2022-01-27

## 2022-06-07 ENCOUNTER — ASOB RESULT (OUTPATIENT)
Age: 32
End: 2022-06-07

## 2022-06-07 ENCOUNTER — APPOINTMENT (OUTPATIENT)
Dept: OBGYN | Facility: CLINIC | Age: 32
End: 2022-06-07
Payer: MEDICAID

## 2022-06-07 VITALS
DIASTOLIC BLOOD PRESSURE: 87 MMHG | SYSTOLIC BLOOD PRESSURE: 133 MMHG | WEIGHT: 215 LBS | HEIGHT: 62 IN | BODY MASS INDEX: 39.56 KG/M2 | HEART RATE: 76 BPM

## 2022-06-07 DIAGNOSIS — Z32.01 ENCOUNTER FOR PREGNANCY TEST, RESULT POSITIVE: ICD-10-CM

## 2022-06-07 DIAGNOSIS — B96.89 ACUTE VAGINITIS: ICD-10-CM

## 2022-06-07 DIAGNOSIS — Z86.2 PERSONAL HISTORY OF DISEASES OF THE BLOOD AND BLOOD-FORMING ORGANS AND CERTAIN DISORDERS INVOLVING THE IMMUNE MECHANISM: ICD-10-CM

## 2022-06-07 DIAGNOSIS — Z86.19 PERSONAL HISTORY OF OTHER INFECTIOUS AND PARASITIC DISEASES: ICD-10-CM

## 2022-06-07 DIAGNOSIS — N76.0 ACUTE VAGINITIS: ICD-10-CM

## 2022-06-07 DIAGNOSIS — Z86.69 PERSONAL HISTORY OF OTHER DISEASES OF THE NERVOUS SYSTEM AND SENSE ORGANS: ICD-10-CM

## 2022-06-07 DIAGNOSIS — N39.0 URINARY TRACT INFECTION, SITE NOT SPECIFIED: ICD-10-CM

## 2022-06-07 PROCEDURE — 76801 OB US < 14 WKS SINGLE FETUS: CPT

## 2022-06-07 PROCEDURE — 99214 OFFICE O/P EST MOD 30 MIN: CPT

## 2022-06-07 RX ORDER — NAPROXEN 375 MG/1
375 TABLET ORAL
Qty: 180 | Refills: 0 | Status: DISCONTINUED | COMMUNITY
Start: 2022-03-02

## 2022-06-07 RX ORDER — HYDROCORTISONE 25 MG/G
2.5 CREAM TOPICAL
Qty: 20 | Refills: 0 | Status: DISCONTINUED | COMMUNITY
Start: 2022-01-25

## 2022-06-07 RX ORDER — MISOPROSTOL 200 UG/1
200 TABLET ORAL
Qty: 4 | Refills: 0 | Status: DISCONTINUED | COMMUNITY
Start: 2022-01-27 | End: 2022-06-07

## 2022-06-07 RX ORDER — CEPHALEXIN 500 MG/1
500 TABLET ORAL
Qty: 28 | Refills: 0 | Status: COMPLETED | COMMUNITY
Start: 2021-12-09 | End: 2022-06-07

## 2022-06-07 RX ORDER — METRONIDAZOLE 7.5 MG/G
0.75 GEL VAGINAL
Qty: 1 | Refills: 0 | Status: COMPLETED | COMMUNITY
Start: 2020-02-05 | End: 2022-06-07

## 2022-06-16 ENCOUNTER — APPOINTMENT (OUTPATIENT)
Dept: ANTEPARTUM | Facility: CLINIC | Age: 32
End: 2022-06-16

## 2022-06-16 ENCOUNTER — LABORATORY RESULT (OUTPATIENT)
Age: 32
End: 2022-06-16

## 2022-06-16 ENCOUNTER — ASOB RESULT (OUTPATIENT)
Age: 32
End: 2022-06-16

## 2022-06-16 PROCEDURE — 76813 OB US NUCHAL MEAS 1 GEST: CPT

## 2022-06-16 PROCEDURE — 76801 OB US < 14 WKS SINGLE FETUS: CPT | Mod: 59

## 2022-06-16 PROCEDURE — 36416 COLLJ CAPILLARY BLOOD SPEC: CPT

## 2022-06-22 PROBLEM — Z86.69 HISTORY OF SEIZURE DISORDER: Status: RESOLVED | Noted: 2022-06-22 | Resolved: 2022-06-22

## 2022-06-22 PROBLEM — N76.0 BACTERIAL VAGINOSIS: Status: RESOLVED | Noted: 2020-02-05 | Resolved: 2020-12-23

## 2022-06-22 PROBLEM — N39.0 ACUTE UTI: Status: RESOLVED | Noted: 2021-12-09 | Resolved: 2022-06-22

## 2022-06-22 PROBLEM — Z86.19 HISTORY OF CANDIDIASIS OF VAGINA: Status: RESOLVED | Noted: 2018-12-14 | Resolved: 2020-12-16

## 2022-06-22 PROBLEM — Z86.2 HISTORY OF SICKLE CELL TRAIT: Status: RESOLVED | Noted: 2022-06-22 | Resolved: 2022-06-22

## 2022-06-22 LAB
ABO + RH PNL BLD: NORMAL
B19V IGG SER QL IA: 2.26 INDEX
B19V IGG+IGM SER-IMP: NORMAL
B19V IGG+IGM SER-IMP: POSITIVE
B19V IGM FLD-ACNC: 0.1 INDEX
B19V IGM SER-ACNC: NEGATIVE
BACTERIA UR CULT: ABNORMAL
BLD GP AB SCN SERPL QL: NORMAL
C TRACH RRNA SPEC QL NAA+PROBE: NOT DETECTED
CYTOLOGY CVX/VAG DOC THIN PREP: NORMAL
HBV SURFACE AG SER QL: NONREACTIVE
HCV AB SER QL: NONREACTIVE
HCV S/CO RATIO: 0.14 S/CO
HPV HIGH+LOW RISK DNA PNL CVX: NOT DETECTED
LEAD BLD-MCNC: <1 UG/DL
N GONORRHOEA RRNA SPEC QL NAA+PROBE: NOT DETECTED
RUBV IGG FLD-ACNC: 1.4 INDEX
RUBV IGG SER-IMP: POSITIVE
SOURCE AMPLIFICATION: NORMAL
T PALLIDUM AB SER QL IA: NEGATIVE
VZV AB TITR SER: POSITIVE
VZV IGG SER IF-ACNC: 700.3 INDEX

## 2022-06-22 RX ORDER — LAMOTRIGINE 150 MG/1
TABLET ORAL
Refills: 0 | Status: ACTIVE | COMMUNITY

## 2022-06-22 RX ORDER — LEVETIRACETAM 1000 MG/1
TABLET, FILM COATED ORAL
Refills: 0 | Status: ACTIVE | COMMUNITY

## 2022-07-05 ENCOUNTER — APPOINTMENT (OUTPATIENT)
Dept: OBGYN | Facility: CLINIC | Age: 32
End: 2022-07-05

## 2022-07-05 VITALS
HEIGHT: 62 IN | SYSTOLIC BLOOD PRESSURE: 130 MMHG | HEART RATE: 81 BPM | WEIGHT: 215 LBS | BODY MASS INDEX: 39.56 KG/M2 | DIASTOLIC BLOOD PRESSURE: 84 MMHG

## 2022-07-05 PROCEDURE — 0501F PRENATAL FLOW SHEET: CPT

## 2022-07-05 RX ORDER — LEVETIRACETAM 500 MG/1
500 TABLET, FILM COATED ORAL
Qty: 90 | Refills: 0 | Status: ACTIVE | COMMUNITY
Start: 2022-04-06

## 2022-07-05 RX ORDER — LAMOTRIGINE 200 MG/1
200 TABLET, EXTENDED RELEASE ORAL
Qty: 90 | Refills: 0 | Status: ACTIVE | COMMUNITY
Start: 2022-04-06

## 2022-07-05 RX ORDER — CEPHALEXIN 500 MG/1
500 TABLET ORAL EVERY 6 HOURS
Qty: 28 | Refills: 0 | Status: DISCONTINUED | COMMUNITY
Start: 2022-06-21 | End: 2022-07-05

## 2022-07-06 ENCOUNTER — TRANSCRIPTION ENCOUNTER (OUTPATIENT)
Age: 32
End: 2022-07-06

## 2022-07-07 ENCOUNTER — NON-APPOINTMENT (OUTPATIENT)
Age: 32
End: 2022-07-07

## 2022-07-07 LAB — BACTERIA UR CULT: ABNORMAL

## 2022-08-08 ENCOUNTER — APPOINTMENT (OUTPATIENT)
Dept: ANTEPARTUM | Facility: CLINIC | Age: 32
End: 2022-08-08

## 2022-08-08 ENCOUNTER — ASOB RESULT (OUTPATIENT)
Age: 32
End: 2022-08-08

## 2022-08-08 PROCEDURE — 76811 OB US DETAILED SNGL FETUS: CPT

## 2022-08-10 ENCOUNTER — APPOINTMENT (OUTPATIENT)
Dept: OBGYN | Facility: CLINIC | Age: 32
End: 2022-08-10

## 2022-08-10 VITALS
DIASTOLIC BLOOD PRESSURE: 70 MMHG | SYSTOLIC BLOOD PRESSURE: 112 MMHG | HEART RATE: 83 BPM | WEIGHT: 212 LBS | HEIGHT: 62 IN | BODY MASS INDEX: 39.01 KG/M2

## 2022-08-10 PROCEDURE — 0502F SUBSEQUENT PRENATAL CARE: CPT

## 2022-08-10 RX ORDER — NITROFURANTOIN (MONOHYDRATE/MACROCRYSTALS) 25; 75 MG/1; MG/1
100 CAPSULE ORAL TWICE DAILY
Qty: 14 | Refills: 0 | Status: DISCONTINUED | COMMUNITY
Start: 2022-07-06 | End: 2022-08-10

## 2022-09-15 ENCOUNTER — APPOINTMENT (OUTPATIENT)
Dept: OBGYN | Facility: CLINIC | Age: 32
End: 2022-09-15

## 2022-09-15 VITALS
HEART RATE: 93 BPM | DIASTOLIC BLOOD PRESSURE: 81 MMHG | SYSTOLIC BLOOD PRESSURE: 132 MMHG | HEIGHT: 62 IN | WEIGHT: 212 LBS | BODY MASS INDEX: 39.01 KG/M2

## 2022-09-15 DIAGNOSIS — Z34.82 ENCOUNTER FOR SUPERVISION OF OTHER NORMAL PREGNANCY, SECOND TRIMESTER: ICD-10-CM

## 2022-09-15 PROCEDURE — 0502F SUBSEQUENT PRENATAL CARE: CPT

## 2022-09-27 ENCOUNTER — APPOINTMENT (OUTPATIENT)
Dept: ANTEPARTUM | Facility: CLINIC | Age: 32
End: 2022-09-27

## 2022-09-27 ENCOUNTER — ASOB RESULT (OUTPATIENT)
Age: 32
End: 2022-09-27

## 2022-09-27 PROCEDURE — 76819 FETAL BIOPHYS PROFIL W/O NST: CPT

## 2022-09-27 PROCEDURE — 76816 OB US FOLLOW-UP PER FETUS: CPT | Mod: 59

## 2022-10-13 ENCOUNTER — APPOINTMENT (OUTPATIENT)
Dept: OBGYN | Facility: CLINIC | Age: 32
End: 2022-10-13
Payer: MEDICAID

## 2022-10-13 VITALS
SYSTOLIC BLOOD PRESSURE: 125 MMHG | DIASTOLIC BLOOD PRESSURE: 77 MMHG | BODY MASS INDEX: 38.28 KG/M2 | HEART RATE: 90 BPM | WEIGHT: 208 LBS | HEIGHT: 62 IN

## 2022-10-13 DIAGNOSIS — H10.33 UNSPECIFIED ACUTE CONJUNCTIVITIS, BILATERAL: ICD-10-CM

## 2022-10-13 PROCEDURE — 0502F SUBSEQUENT PRENATAL CARE: CPT

## 2022-10-13 RX ORDER — CEPHALEXIN 500 MG/1
500 TABLET ORAL
Qty: 28 | Refills: 0 | Status: ACTIVE | COMMUNITY
Start: 2022-10-13 | End: 1900-01-01

## 2022-10-13 RX ORDER — ERYTHROMYCIN 5 MG/G
5 OINTMENT OPHTHALMIC
Qty: 1 | Refills: 0 | Status: ACTIVE | COMMUNITY
Start: 2022-10-13 | End: 1900-01-01

## 2022-10-25 ENCOUNTER — ASOB RESULT (OUTPATIENT)
Age: 32
End: 2022-10-25

## 2022-10-25 ENCOUNTER — APPOINTMENT (OUTPATIENT)
Dept: ANTEPARTUM | Facility: CLINIC | Age: 32
End: 2022-10-25

## 2022-10-25 PROCEDURE — 99212 OFFICE O/P EST SF 10 MIN: CPT | Mod: 25

## 2022-10-25 PROCEDURE — 76818 FETAL BIOPHYS PROFILE W/NST: CPT

## 2022-10-25 PROCEDURE — 76816 OB US FOLLOW-UP PER FETUS: CPT | Mod: 59

## 2022-10-25 PROCEDURE — 76820 UMBILICAL ARTERY ECHO: CPT | Mod: 59

## 2022-11-01 ENCOUNTER — NON-APPOINTMENT (OUTPATIENT)
Age: 32
End: 2022-11-01

## 2022-11-01 ENCOUNTER — APPOINTMENT (OUTPATIENT)
Dept: OBGYN | Facility: CLINIC | Age: 32
End: 2022-11-01

## 2022-11-01 VITALS
DIASTOLIC BLOOD PRESSURE: 74 MMHG | SYSTOLIC BLOOD PRESSURE: 120 MMHG | BODY MASS INDEX: 39.2 KG/M2 | HEIGHT: 62 IN | HEART RATE: 87 BPM | WEIGHT: 213 LBS

## 2022-11-01 DIAGNOSIS — N39.0 URINARY TRACT INFECTION, SITE NOT SPECIFIED: ICD-10-CM

## 2022-11-01 PROCEDURE — 0502F SUBSEQUENT PRENATAL CARE: CPT

## 2022-11-01 RX ORDER — CEPHALEXIN 250 MG/1
250 CAPSULE ORAL
Qty: 30 | Refills: 2 | Status: ACTIVE | COMMUNITY
Start: 2022-11-01 | End: 1900-01-01

## 2022-11-03 ENCOUNTER — APPOINTMENT (OUTPATIENT)
Dept: ANTEPARTUM | Facility: CLINIC | Age: 32
End: 2022-11-03

## 2022-11-03 ENCOUNTER — ASOB RESULT (OUTPATIENT)
Age: 32
End: 2022-11-03

## 2022-11-03 PROCEDURE — 76818 FETAL BIOPHYS PROFILE W/NST: CPT

## 2022-11-03 PROCEDURE — 76820 UMBILICAL ARTERY ECHO: CPT | Mod: 59

## 2022-11-10 ENCOUNTER — ASOB RESULT (OUTPATIENT)
Age: 32
End: 2022-11-10

## 2022-11-10 ENCOUNTER — APPOINTMENT (OUTPATIENT)
Dept: ANTEPARTUM | Facility: CLINIC | Age: 32
End: 2022-11-10

## 2022-11-10 PROCEDURE — 76818 FETAL BIOPHYS PROFILE W/NST: CPT

## 2022-11-10 PROCEDURE — 76820 UMBILICAL ARTERY ECHO: CPT | Mod: 59

## 2022-11-17 ENCOUNTER — APPOINTMENT (OUTPATIENT)
Dept: ANTEPARTUM | Facility: CLINIC | Age: 32
End: 2022-11-17

## 2022-11-17 ENCOUNTER — ASOB RESULT (OUTPATIENT)
Age: 32
End: 2022-11-17

## 2022-11-17 PROCEDURE — 76818 FETAL BIOPHYS PROFILE W/NST: CPT

## 2022-11-17 PROCEDURE — 76820 UMBILICAL ARTERY ECHO: CPT

## 2022-11-17 PROCEDURE — 76816 OB US FOLLOW-UP PER FETUS: CPT

## 2022-11-22 ENCOUNTER — APPOINTMENT (OUTPATIENT)
Dept: OBGYN | Facility: CLINIC | Age: 32
End: 2022-11-22

## 2022-11-22 VITALS
SYSTOLIC BLOOD PRESSURE: 121 MMHG | WEIGHT: 212 LBS | HEIGHT: 62 IN | DIASTOLIC BLOOD PRESSURE: 82 MMHG | BODY MASS INDEX: 39.01 KG/M2

## 2022-11-22 PROCEDURE — 0502F SUBSEQUENT PRENATAL CARE: CPT

## 2022-11-23 ENCOUNTER — ASOB RESULT (OUTPATIENT)
Age: 32
End: 2022-11-23

## 2022-11-23 ENCOUNTER — APPOINTMENT (OUTPATIENT)
Dept: ANTEPARTUM | Facility: CLINIC | Age: 32
End: 2022-11-23

## 2022-11-23 PROCEDURE — 76820 UMBILICAL ARTERY ECHO: CPT

## 2022-11-23 PROCEDURE — 76818 FETAL BIOPHYS PROFILE W/NST: CPT

## 2022-11-29 ENCOUNTER — NON-APPOINTMENT (OUTPATIENT)
Age: 32
End: 2022-11-29

## 2022-11-29 ENCOUNTER — APPOINTMENT (OUTPATIENT)
Dept: OBGYN | Facility: CLINIC | Age: 32
End: 2022-11-29

## 2022-11-29 VITALS
BODY MASS INDEX: 39.2 KG/M2 | HEART RATE: 89 BPM | SYSTOLIC BLOOD PRESSURE: 123 MMHG | DIASTOLIC BLOOD PRESSURE: 81 MMHG | HEIGHT: 62 IN | WEIGHT: 213 LBS

## 2022-11-29 LAB
1ST TRIMESTER DATA: NORMAL
2ND TRIMESTER DATA: NORMAL
AFP PNL SERPL: NORMAL
AFP SERPL-ACNC: NORMAL
AFP SERPL-ACNC: NORMAL
B-HCG FREE SERPL-MCNC: NORMAL
BACTERIA UR CULT: ABNORMAL
BASOPHILS # BLD AUTO: 0.02 K/UL
BASOPHILS NFR BLD AUTO: 0.3 %
CLINICAL BIOCHEMIST REVIEW: NORMAL
EOSINOPHIL # BLD AUTO: 0.02 K/UL
EOSINOPHIL NFR BLD AUTO: 0.3 %
FREE BETA HCG 1ST TRIMESTER: NORMAL
GLUCOSE 1H P 50 G GLC PO SERPL-MCNC: 93 MG/DL
GP B STREP DNA SPEC QL NAA+PROBE: NOT DETECTED
HCT VFR BLD CALC: 33.1 %
HGB BLD-MCNC: 10.5 G/DL
HIV1+2 AB SPEC QL IA.RAPID: NONREACTIVE
IMM GRANULOCYTES NFR BLD AUTO: 0.7 %
INHIBIN A SERPL-MCNC: NORMAL
LYMPHOCYTES # BLD AUTO: 1.46 K/UL
LYMPHOCYTES NFR BLD AUTO: 25 %
MAN DIFF?: NORMAL
MCHC RBC-ENTMCNC: 26.1 PG
MCHC RBC-ENTMCNC: 31.7 GM/DL
MCV RBC AUTO: 82.1 FL
MONOCYTES # BLD AUTO: 0.43 K/UL
MONOCYTES NFR BLD AUTO: 7.4 %
NEUTROPHILS # BLD AUTO: 3.88 K/UL
NEUTROPHILS NFR BLD AUTO: 66.3 %
NOTES NTD: NORMAL
NT: NORMAL
PAPP-A SERPL-ACNC: NORMAL
PLATELET # BLD AUTO: 243 K/UL
RBC # BLD: 4.03 M/UL
RBC # FLD: 13.5 %
SOURCE GBS: NORMAL
T PALLIDUM AB SER QL IA: NEGATIVE
U ESTRIOL SERPL-SCNC: NORMAL
WBC # FLD AUTO: 5.85 K/UL

## 2022-11-29 PROCEDURE — 0502F SUBSEQUENT PRENATAL CARE: CPT

## 2022-11-30 ENCOUNTER — ASOB RESULT (OUTPATIENT)
Age: 32
End: 2022-11-30

## 2022-11-30 ENCOUNTER — APPOINTMENT (OUTPATIENT)
Dept: ANTEPARTUM | Facility: CLINIC | Age: 32
End: 2022-11-30

## 2022-11-30 PROCEDURE — 76818 FETAL BIOPHYS PROFILE W/NST: CPT

## 2022-11-30 PROCEDURE — 76820 UMBILICAL ARTERY ECHO: CPT

## 2022-12-05 ENCOUNTER — OUTPATIENT (OUTPATIENT)
Dept: OUTPATIENT SERVICES | Facility: HOSPITAL | Age: 32
LOS: 1 days | End: 2022-12-05

## 2022-12-05 VITALS
SYSTOLIC BLOOD PRESSURE: 140 MMHG | WEIGHT: 220.02 LBS | DIASTOLIC BLOOD PRESSURE: 80 MMHG | RESPIRATION RATE: 18 BRPM | OXYGEN SATURATION: 99 % | TEMPERATURE: 97 F | HEART RATE: 86 BPM | HEIGHT: 63.5 IN

## 2022-12-05 DIAGNOSIS — Z98.891 HISTORY OF UTERINE SCAR FROM PREVIOUS SURGERY: Chronic | ICD-10-CM

## 2022-12-05 DIAGNOSIS — Z98.891 HISTORY OF UTERINE SCAR FROM PREVIOUS SURGERY: ICD-10-CM

## 2022-12-05 DIAGNOSIS — Z34.90 ENCOUNTER FOR SUPERVISION OF NORMAL PREGNANCY, UNSPECIFIED, UNSPECIFIED TRIMESTER: ICD-10-CM

## 2022-12-05 LAB
ANION GAP SERPL CALC-SCNC: 11 MMOL/L — SIGNIFICANT CHANGE UP (ref 7–14)
APPEARANCE UR: CLEAR — SIGNIFICANT CHANGE UP
BILIRUB UR-MCNC: NEGATIVE — SIGNIFICANT CHANGE UP
BLD GP AB SCN SERPL QL: NEGATIVE — SIGNIFICANT CHANGE UP
BUN SERPL-MCNC: 4 MG/DL — LOW (ref 7–23)
CALCIUM SERPL-MCNC: 8.3 MG/DL — LOW (ref 8.4–10.5)
CHLORIDE SERPL-SCNC: 106 MMOL/L — SIGNIFICANT CHANGE UP (ref 98–107)
CO2 SERPL-SCNC: 22 MMOL/L — SIGNIFICANT CHANGE UP (ref 22–31)
COLOR SPEC: YELLOW — SIGNIFICANT CHANGE UP
CREAT SERPL-MCNC: 0.69 MG/DL — SIGNIFICANT CHANGE UP (ref 0.5–1.3)
DIFF PNL FLD: NEGATIVE — SIGNIFICANT CHANGE UP
EGFR: 118 ML/MIN/1.73M2 — SIGNIFICANT CHANGE UP
GLUCOSE SERPL-MCNC: 71 MG/DL — SIGNIFICANT CHANGE UP (ref 70–99)
GLUCOSE UR QL: NEGATIVE — SIGNIFICANT CHANGE UP
HCT VFR BLD CALC: 31.6 % — LOW (ref 34.5–45)
HGB BLD-MCNC: 9.9 G/DL — LOW (ref 11.5–15.5)
KETONES UR-MCNC: NEGATIVE — SIGNIFICANT CHANGE UP
LEUKOCYTE ESTERASE UR-ACNC: NEGATIVE — SIGNIFICANT CHANGE UP
MCHC RBC-ENTMCNC: 25.6 PG — LOW (ref 27–34)
MCHC RBC-ENTMCNC: 31.3 GM/DL — LOW (ref 32–36)
MCV RBC AUTO: 81.7 FL — SIGNIFICANT CHANGE UP (ref 80–100)
NITRITE UR-MCNC: NEGATIVE — SIGNIFICANT CHANGE UP
NRBC # BLD: 0 /100 WBCS — SIGNIFICANT CHANGE UP (ref 0–0)
NRBC # FLD: 0 K/UL — SIGNIFICANT CHANGE UP (ref 0–0)
PH UR: 6 — SIGNIFICANT CHANGE UP (ref 5–8)
PLATELET # BLD AUTO: 206 K/UL — SIGNIFICANT CHANGE UP (ref 150–400)
POTASSIUM SERPL-MCNC: 3.8 MMOL/L — SIGNIFICANT CHANGE UP (ref 3.5–5.3)
POTASSIUM SERPL-SCNC: 3.8 MMOL/L — SIGNIFICANT CHANGE UP (ref 3.5–5.3)
PROT UR-MCNC: ABNORMAL
RBC # BLD: 3.87 M/UL — SIGNIFICANT CHANGE UP (ref 3.8–5.2)
RBC # FLD: 13.3 % — SIGNIFICANT CHANGE UP (ref 10.3–14.5)
RH IG SCN BLD-IMP: POSITIVE — SIGNIFICANT CHANGE UP
SODIUM SERPL-SCNC: 139 MMOL/L — SIGNIFICANT CHANGE UP (ref 135–145)
SP GR SPEC: 1.02 — SIGNIFICANT CHANGE UP (ref 1.01–1.05)
UROBILINOGEN FLD QL: SIGNIFICANT CHANGE UP
WBC # BLD: 5.12 K/UL — SIGNIFICANT CHANGE UP (ref 3.8–10.5)
WBC # FLD AUTO: 5.12 K/UL — SIGNIFICANT CHANGE UP (ref 3.8–10.5)

## 2022-12-05 PROCEDURE — 93010 ELECTROCARDIOGRAM REPORT: CPT

## 2022-12-05 NOTE — OB PST NOTE - NSICDXPASTMEDICALHX_GEN_ALL_CORE_FT
PAST MEDICAL HISTORY:  HTN (hypertension) DENIES HYPERTENSION. States her BP will fluctuate     PAST MEDICAL HISTORY:  History of seizures "partial complex seizures" -- last bout March 2021    HTN (hypertension) DENIES HYPERTENSION. States her BP will fluctuate    UTI (urinary tract infection) takes medication daily

## 2022-12-05 NOTE — OB PST NOTE - HISTORY OF PRESENT ILLNESS
This is a 31 y/o female who is 39 weeks and 1 day gestation who is scheduled for repeat  on 22 with ALEX 22. Last fetal movement ; denies vaginal bleeding. This is a 31 y/o female who is 39 weeks and 1 day gestation who is scheduled for repeat  on 22 with ALEX 22. Last fetal movement few moments ago; denies vaginal bleeding.

## 2022-12-05 NOTE — OB PST NOTE - PROBLEM SELECTOR PLAN 1
This is a 31 y/o female who is scheduled for repeat  on 22 with ALEX 22  * Ordered covid-19 pcr; given phone number and places that do covid testing to be done 3-5 days before surgery   * Given preop and cleanser instructions with good teach back and patient verbalized understanding   * Lab work included: t&s, bmp, cbc, ua, covid-19 pcr and had ekg due to h/o htn  * Instructed to speak with ob-gyn regarding preop medications including Keppra which she takes at night. Last prenatal vitamin on 22 This is a 33 y/o female who is scheduled for repeat  on 22 with ALEX 22  * Ordered covid-19 pcr; given phone number and places that do covid testing to be done 3-5 days before surgery   * Given preop and cleanser instructions with good teach back and patient verbalized understanding   * Lab work included: t&s, bmp, cbc, ua, covid-19 pcr and had ekg due to h/o htn  * Instructed to speak with ob-gyn regarding preop medications including Keppra and lamotrigine. Last prenatal vitamin on 22

## 2022-12-05 NOTE — OB PST NOTE - NSHPPHYSICALEXAM_GEN_ALL_CORE
Constitutional: Well Developed, Well Groomed, Well Nourished, No Distress    Eyes: EOMI    Ears: Normal    Mouth & Gums: Normal, moist    Pharynx: No tenderness, discharge    Tonsils: No Redness, discharge, tenderness, or swelling    Neck: Supple, normal thyroid gland    Breast: no tenderness    Back: ROM intact    Respiratory: CTA, no rales, no rhonchi, no wheezing    Cardiovascular:  Regular rate and rhythm, no rubs or murmur, S 1 S 2    Gastrointestinal: Soft, non-tender, non distention, no masses palpable, bowel sound normal    Extremities: b/l  pedal edema    Vascular:  Radial Pulse normal    Neurological: alert & oriented x 3, sensation intact    Skin: warm and dry    Lymph Nodes: no enlarged lymph nodes    Musculoskeletal: ROM intact    Psychiatric: normal affect, normal behavior

## 2022-12-05 NOTE — OB PST NOTE - NSANTHOSAYNRD_GEN_A_CORE
No. CORRIE screening performed.  STOP BANG Legend: 0-2 = LOW Risk; 3-4 = INTERMEDIATE Risk; 5-8 = HIGH Risk

## 2022-12-05 NOTE — OB PST NOTE - NSHPREVIEWOFSYSTEMS_GEN_ALL_CORE
General: No fever, chills, sweating    Skin: No rashes, itching, or dryness    Breast: No tenderness    Ophthalmologic: wears glasses; No diplopia, photophobia    ENMT Symptoms: No hearing difficulty, nasal congestion    Respiratory and Thorax: No wheezing, dyspnea, cough     Cardiovascular: No chest pain, palpitations, dyspnea on exertion    Gastrointestinal: No nausea, vomiting, diarrhea, constipation, abdominal pain    Genitourinary/ Pelvis: No hematuria, renal colic, or flank pain    Musculoskeletal: No arthralgia    Neurological: "partial complex seizures" -- last bout March 2021; denies CVA or migraines    Psychiatric: No suicidal ideation, depression, anxiety    Hematology: No gum bleeding, nose bleeding    Lymphatic: b/l ankle edema; No enlarged or tender lymph nodes    Endocrine: No heat or cold intolerance; denies DM or thyroid disease    Immunologic: UTI -- on medication

## 2022-12-06 ENCOUNTER — APPOINTMENT (OUTPATIENT)
Dept: OBGYN | Facility: CLINIC | Age: 32
End: 2022-12-06

## 2022-12-06 VITALS
HEIGHT: 62 IN | WEIGHT: 218 LBS | HEART RATE: 81 BPM | BODY MASS INDEX: 40.12 KG/M2 | DIASTOLIC BLOOD PRESSURE: 83 MMHG | SYSTOLIC BLOOD PRESSURE: 125 MMHG

## 2022-12-06 DIAGNOSIS — Z34.83 ENCOUNTER FOR SUPERVISION OF OTHER NORMAL PREGNANCY, THIRD TRIMESTER: ICD-10-CM

## 2022-12-06 LAB — BACTERIA UR CULT: NORMAL

## 2022-12-06 PROCEDURE — 0502F SUBSEQUENT PRENATAL CARE: CPT

## 2022-12-07 ENCOUNTER — APPOINTMENT (OUTPATIENT)
Dept: ANTEPARTUM | Facility: CLINIC | Age: 32
End: 2022-12-07

## 2022-12-07 ENCOUNTER — ASOB RESULT (OUTPATIENT)
Age: 32
End: 2022-12-07

## 2022-12-07 PROBLEM — N39.0 URINARY TRACT INFECTION, SITE NOT SPECIFIED: Chronic | Status: ACTIVE | Noted: 2022-12-05

## 2022-12-07 PROBLEM — Z87.898 PERSONAL HISTORY OF OTHER SPECIFIED CONDITIONS: Chronic | Status: ACTIVE | Noted: 2022-12-05

## 2022-12-07 PROCEDURE — 76816 OB US FOLLOW-UP PER FETUS: CPT

## 2022-12-07 PROCEDURE — 76820 UMBILICAL ARTERY ECHO: CPT | Mod: 59

## 2022-12-07 PROCEDURE — 99212 OFFICE O/P EST SF 10 MIN: CPT | Mod: 25

## 2022-12-07 PROCEDURE — 76818 FETAL BIOPHYS PROFILE W/NST: CPT

## 2022-12-10 PROBLEM — I10 ESSENTIAL (PRIMARY) HYPERTENSION: Chronic | Status: ACTIVE | Noted: 2019-07-18

## 2022-12-13 ENCOUNTER — APPOINTMENT (OUTPATIENT)
Dept: OBGYN | Facility: CLINIC | Age: 32
End: 2022-12-13

## 2022-12-13 ENCOUNTER — ASOB RESULT (OUTPATIENT)
Age: 32
End: 2022-12-13

## 2022-12-13 ENCOUNTER — APPOINTMENT (OUTPATIENT)
Dept: ANTEPARTUM | Facility: CLINIC | Age: 32
End: 2022-12-13

## 2022-12-13 VITALS
WEIGHT: 217 LBS | HEART RATE: 88 BPM | SYSTOLIC BLOOD PRESSURE: 138 MMHG | BODY MASS INDEX: 39.93 KG/M2 | HEIGHT: 62 IN | DIASTOLIC BLOOD PRESSURE: 81 MMHG

## 2022-12-13 PROCEDURE — 76820 UMBILICAL ARTERY ECHO: CPT

## 2022-12-13 PROCEDURE — 76818 FETAL BIOPHYS PROFILE W/NST: CPT

## 2022-12-13 PROCEDURE — 0502F SUBSEQUENT PRENATAL CARE: CPT

## 2022-12-16 ENCOUNTER — APPOINTMENT (OUTPATIENT)
Dept: ANTEPARTUM | Facility: CLINIC | Age: 32
End: 2022-12-16

## 2022-12-16 ENCOUNTER — ASOB RESULT (OUTPATIENT)
Age: 32
End: 2022-12-16

## 2022-12-16 PROCEDURE — 76818 FETAL BIOPHYS PROFILE W/NST: CPT

## 2022-12-16 PROCEDURE — 76820 UMBILICAL ARTERY ECHO: CPT

## 2022-12-19 ENCOUNTER — APPOINTMENT (OUTPATIENT)
Dept: OBGYN | Facility: HOSPITAL | Age: 32
End: 2022-12-19

## 2022-12-20 ENCOUNTER — OUTPATIENT (OUTPATIENT)
Dept: INPATIENT UNIT | Facility: HOSPITAL | Age: 32
LOS: 1 days | Discharge: LEFT BEFORE DISCHARGE | End: 2022-12-20
Payer: MEDICAID

## 2022-12-20 ENCOUNTER — APPOINTMENT (OUTPATIENT)
Dept: ANTEPARTUM | Facility: CLINIC | Age: 32
End: 2022-12-20

## 2022-12-20 ENCOUNTER — ASOB RESULT (OUTPATIENT)
Age: 32
End: 2022-12-20

## 2022-12-20 VITALS
RESPIRATION RATE: 20 BRPM | TEMPERATURE: 100 F | SYSTOLIC BLOOD PRESSURE: 131 MMHG | DIASTOLIC BLOOD PRESSURE: 67 MMHG | HEART RATE: 95 BPM

## 2022-12-20 VITALS — SYSTOLIC BLOOD PRESSURE: 147 MMHG | HEART RATE: 100 BPM | DIASTOLIC BLOOD PRESSURE: 76 MMHG

## 2022-12-20 DIAGNOSIS — O26.899 OTHER SPECIFIED PREGNANCY RELATED CONDITIONS, UNSPECIFIED TRIMESTER: ICD-10-CM

## 2022-12-20 DIAGNOSIS — Z98.891 HISTORY OF UTERINE SCAR FROM PREVIOUS SURGERY: Chronic | ICD-10-CM

## 2022-12-20 LAB
B PERT DNA SPEC QL NAA+PROBE: SIGNIFICANT CHANGE UP
B PERT+PARAPERT DNA PNL SPEC NAA+PROBE: SIGNIFICANT CHANGE UP
BORDETELLA PARAPERTUSSIS (RAPRVP): SIGNIFICANT CHANGE UP
C PNEUM DNA SPEC QL NAA+PROBE: SIGNIFICANT CHANGE UP
FLUAV H1 2009 PAND RNA SPEC QL NAA+PROBE: DETECTED
FLUBV RNA SPEC QL NAA+PROBE: SIGNIFICANT CHANGE UP
HADV DNA SPEC QL NAA+PROBE: SIGNIFICANT CHANGE UP
HCOV 229E RNA SPEC QL NAA+PROBE: SIGNIFICANT CHANGE UP
HCOV HKU1 RNA SPEC QL NAA+PROBE: SIGNIFICANT CHANGE UP
HCOV NL63 RNA SPEC QL NAA+PROBE: SIGNIFICANT CHANGE UP
HCOV OC43 RNA SPEC QL NAA+PROBE: SIGNIFICANT CHANGE UP
HMPV RNA SPEC QL NAA+PROBE: SIGNIFICANT CHANGE UP
HPIV1 RNA SPEC QL NAA+PROBE: SIGNIFICANT CHANGE UP
HPIV2 RNA SPEC QL NAA+PROBE: SIGNIFICANT CHANGE UP
HPIV3 RNA SPEC QL NAA+PROBE: SIGNIFICANT CHANGE UP
HPIV4 RNA SPEC QL NAA+PROBE: SIGNIFICANT CHANGE UP
M PNEUMO DNA SPEC QL NAA+PROBE: SIGNIFICANT CHANGE UP
RAPID RVP RESULT: DETECTED
RSV RNA SPEC QL NAA+PROBE: SIGNIFICANT CHANGE UP
RV+EV RNA SPEC QL NAA+PROBE: SIGNIFICANT CHANGE UP
SARS-COV-2 RNA SPEC QL NAA+PROBE: SIGNIFICANT CHANGE UP

## 2022-12-20 PROCEDURE — 76820 UMBILICAL ARTERY ECHO: CPT

## 2022-12-20 PROCEDURE — 99213 OFFICE O/P EST LOW 20 MIN: CPT

## 2022-12-20 PROCEDURE — 76818 FETAL BIOPHYS PROFILE W/NST: CPT

## 2022-12-20 NOTE — OB PROVIDER TRIAGE NOTE - NS_PARA_OBGYN_ALL_OB_NU
----- Message from Angie Brandt sent at 1/9/2018 10:31 AM CST -----  Contact: Patient  Patient:  Rosaura Gertrude  Lab:  Applifier  Labs requested:  Lipid Panel and BMP  Appointment Date:  Monday March 26, 2018   1

## 2022-12-20 NOTE — OB PROVIDER TRIAGE NOTE - NSHPPHYSICALEXAM_GEN_ALL_CORE
T(C): 37.5 (12-20-22 @ 14:45), Max: 37.5 (12-20-22 @ 14:45)  HR: 108 (12-20-22 @ 15:53) (95 - 108)  BP: 143/84 (12-20-22 @ 15:53) (131/67 - 157/82)  RR: 20 (12-20-22 @ 14:45) (20 - 20)  SpO2: --  General: Female sitting comfortably in no apparent distress, coughing throughout exam.   Head: Normocephalic. Atraumatic.   Eyes: No discharge, lids normal, conjunctiva normal  Lungs: No resp distress  Abdomen: Soft, nontender. Gravid.   SVE: No erythema, edema, lesions, blood, or leaking fluid at external genitalia. 0.5 / 40 / -3  NST: 145bpm. Moderate variability. 15x15 accelerations present. Category 1. Tocometer with contractions q8-10 minutes.    TAUS: Vertex. Done to confirm presentation   Neuro: No facial asymmetry, no slurred speech, moves all 4 extremities  Mood: Alert and lucid, appropriate mood and affect T(C): 37.5 (12-20-22 @ 14:45), Max: 37.5 (12-20-22 @ 14:45)  HR: 108 (12-20-22 @ 15:53) (95 - 108)  BP: 143/84 (12-20-22 @ 15:53) (131/67 - 157/82)  RR: 20 (12-20-22 @ 14:45) (20 - 20)  SpO2: --  General: Female sitting comfortably in no apparent distress, coughing throughout exam.   Head: Normocephalic. Atraumatic.   Eyes: No discharge, lids normal, conjunctiva normal  Lungs: No resp distress  Abdomen: Soft, nontender. Gravid.   SVE: No erythema, edema, lesions, blood, or leaking fluid at external genitalia. 0.5 / 40 / -3  NST: 145bpm. Moderate variability. 15x15 accelerations present. Category 1. Tocometer with contractions q8-10 minutes.    TAUS: Vertex. Posterior placenta. DONELL 13. BPP 8/8.   Neuro: No facial asymmetry, no slurred speech, moves all 4 extremities  Mood: Alert and lucid, appropriate mood and affect

## 2022-12-20 NOTE — OB PROVIDER TRIAGE NOTE - NSOBPROVIDERNOTE_OBGYN_ALL_OB_FT
Ms. ROBE LLOYD is a 32y  40w3d referred from ATU for prolonged monitoring.     No erythema, edema, lesions, blood, or leaking fluid at external genitalia. 0.5 / 40 / -3. Category 1. Tocometer with contractions q8-10 minutes. BPs 140s/80s, one elevated pressure after undressing 157/82.     Plan  - Prolonged monitoring. Ms. ROBE LLOYD is a 32y  40w3d referred from ATU for prolonged monitoring.     - No erythema, edema, lesions, blood, or leaking fluid at external genitalia. 0.5 / 40 / -3. Category 1. Tocometer with contractions q8-10 minutes. BPs 140s/80s, one elevated pressure after undressing 157/82.   - RVP sent     Plan  - Prolonged monitoring. Ms. ROBE LLOYD is a 32y  40w3d referred from ATU for prolonged monitoring.     - No erythema, edema, lesions, blood, or leaking fluid at external genitalia. 0.5 / 40 / -3. Category 1. Tocometer with contractions q8-10 minutes. BPs 140s/80s, one elevated pressure after undressing 157/82. BPP 8/8.. DONELL 13.   - RVP sent   - While coughing , patient notes leaking urine. Speculum exam performed, confirmed. Negative pooling. Negative [place]. Light yellow wet spots on sridevi underneath patient.     Plan  - FU RVP results.   - Recommendation for c/s or induction of labor with cervical balloon given elevated blood pressures, IUGR.   - Patient is leaving against medical advice. Patient was counseled at length regarding the risks of leaving against medical advice.   - Told to follow up with Dr. Marina tomorrow.   - Told to return for decreased / no fetal movement, vaginal bleeding similar to that of a period, leaking / gush of fluid, regular contractions occurring 4-5 minutes  for one hour or requiring pain medication   - Patient and partner and educated of plan and demonstrate understanding. All questions answered. AMA documentation signed with Dr. Stout.     Plan d/w Dr. Stout

## 2022-12-20 NOTE — CHART NOTE - NSCHARTNOTEFT_GEN_A_CORE
OB Attg    PAtient evaluated by me in triage. Sent from ATU due to nonreactive NST. Chronic HTN  not on meds, previous c/s in 2018 due to arrest of dilation at 4cm. Scheduled by primary OB for rpt C/s   at 40.2 weeks, did not show up for C/s. Patient explains that she wants more time to go into labor.   Described recovery from last c/s as difficult. Currently -150/80's. Cat 1 FHT. Partner present for discussion. Patient given option for   rpt C/s at this time due to elevated BP and worsening CHTN as well as Induction of labor with cervical balloon/pitocin. Explained the process for induction  as well as risks and challenges given prior C/s. Discussed that rpt C/s when not in labor may present as a different and easier recovery in terms of pain.   Patient considering options, but does not want to be induced at this time. States that she would "like another day or so to consider". Explained to patient that   at this time, there is no improved fetal outcomes by waiting after 40 weeks. Explained increased risks of meconium, increased risks of developing preeclampsia   as well as increase for maternal and fetal morbidity and mortality. Patient understands risks and benefits and signed AMA paperwork. Will see primary OB tomorrow  and consider returning tomorrow for induction.   Tory Stout MD

## 2022-12-20 NOTE — OB RN TRIAGE NOTE - NSICDXPASTMEDICALHX_GEN_ALL_CORE_FT
PAST MEDICAL HISTORY:  History of seizures "partial complex seizures" -- last bout March 2021    HTN (hypertension) DENIES HYPERTENSION. States her BP will fluctuate    UTI (urinary tract infection) takes medication daily

## 2022-12-20 NOTE — OB PROVIDER TRIAGE NOTE - HISTORY OF PRESENT ILLNESS
Ms. ROBE LLOYD is a 32y  40w3d referred from ATU for prolonged monitoring.  Admits to contractions q1hr, 6/10. + FM. Denies vaginal bleeding, leaking of fluid. Patient reports new cough that developed on Saturday, 4 days ago, denies any other associated symptoms including fevers, chills, sore throat, headache, unvaccinated against Covid-19, Covid-19 test done prior to sx on 12/15 for scheduled c/s negative. Patient scheduled for repeat c/s yday due to IUGR, cHTN.    PNC: IUGR. cHTN.  Pt reports no hospitalizations, procedures, infections. Pt denies complications with blood sugar.     No Known Allergies    OBHx:   - 2019 c/s FT for arrest of labor @42w  GynHx:  - Denies   PMH:   - HTN  PSH:   - Tonsillectomy    Psych:   - Denies   Social:   - Denies   Meds:   - PNV    Ms. ROBE LLOYD is a 32y  40w3d referred from ATU for prolonged monitoring.  Admits to contractions q1hr, 6/10. + FM. Denies vaginal bleeding, leaking of fluid. Patient reports new cough that developed on Saturday, 4 days ago, denies any other associated symptoms including fevers, chills, sore throat, headache, unvaccinated against Covid-19, Covid-19 test done prior to sx on 12/15 for scheduled c/s negative. Patient scheduled for repeat c/s yday due to IUGR, cHTN.    PNC: IUGR. cHTN.  Pt reports no hospitalizations, procedures, infections. Pt denies complications with blood sugar.     No Known Allergies    OBHx:   - 2019 c/s FT for arrest of labor @42w  GynHx:  - Denies   PMH:   - Seizures  - HTN  PSH:   - Tonsillectomy    Psych:   - Denies   Social:   - Denies   Meds:   - PNV   - Kepra 1000mg BID, dose recently increased from 1500 once daily on Friday  - Lamotrigine 200mg daily

## 2022-12-21 ENCOUNTER — INPATIENT (INPATIENT)
Facility: HOSPITAL | Age: 32
LOS: 0 days | Discharge: HOME | End: 2022-12-21
Attending: OBSTETRICS & GYNECOLOGY | Admitting: OBSTETRICS & GYNECOLOGY

## 2022-12-21 ENCOUNTER — APPOINTMENT (OUTPATIENT)
Dept: OBGYN | Facility: CLINIC | Age: 32
End: 2022-12-21

## 2022-12-21 ENCOUNTER — INPATIENT (INPATIENT)
Facility: HOSPITAL | Age: 32
LOS: 3 days | Discharge: HOME | End: 2022-12-25
Attending: OBSTETRICS & GYNECOLOGY | Admitting: OBSTETRICS & GYNECOLOGY
Payer: MEDICAID

## 2022-12-21 VITALS — TEMPERATURE: 98 F | RESPIRATION RATE: 15 BRPM

## 2022-12-21 VITALS
SYSTOLIC BLOOD PRESSURE: 139 MMHG | BODY MASS INDEX: 39.93 KG/M2 | WEIGHT: 217 LBS | DIASTOLIC BLOOD PRESSURE: 85 MMHG | HEIGHT: 62 IN

## 2022-12-21 DIAGNOSIS — O48.0 POST-TERM PREGNANCY: ICD-10-CM

## 2022-12-21 DIAGNOSIS — O36.5990 MATERNAL CARE FOR OTHER KNOWN OR SUSPECTED POOR FETAL GROWTH, UNSPECIFIED TRIMESTER, NOT APPLICABLE OR UNSPECIFIED: ICD-10-CM

## 2022-12-21 DIAGNOSIS — Z78.9 OTHER SPECIFIED HEALTH STATUS: ICD-10-CM

## 2022-12-21 DIAGNOSIS — Z98.891 HISTORY OF UTERINE SCAR FROM PREVIOUS SURGERY: Chronic | ICD-10-CM

## 2022-12-21 DIAGNOSIS — Z53.20 PROCEDURE AND TREATMENT NOT CARRIED OUT BECAUSE OF PATIENT'S DECISION FOR UNSPECIFIED REASONS: ICD-10-CM

## 2022-12-21 DIAGNOSIS — J11.1 INFLUENZA DUE TO UNIDENTIFIED INFLUENZA VIRUS WITH OTHER RESPIRATORY MANIFESTATIONS: ICD-10-CM

## 2022-12-21 DIAGNOSIS — Z98.891 HISTORY OF UTERINE SCAR FROM PREVIOUS SURGERY: ICD-10-CM

## 2022-12-21 DIAGNOSIS — O10.919 UNSPECIFIED PRE-EXISTING HYPERTENSION COMPLICATING PREGNANCY, UNSPECIFIED TRIMESTER: ICD-10-CM

## 2022-12-21 DIAGNOSIS — Z90.89 ACQUIRED ABSENCE OF OTHER ORGANS: Chronic | ICD-10-CM

## 2022-12-21 LAB
APPEARANCE UR: ABNORMAL
APTT BLD: 30.8 SEC — SIGNIFICANT CHANGE UP (ref 27–36.3)
BACTERIA # UR AUTO: NEGATIVE — SIGNIFICANT CHANGE UP
BACTERIA UR CULT: NORMAL
BASOPHILS # BLD AUTO: 0.01 K/UL — SIGNIFICANT CHANGE UP (ref 0–0.2)
BASOPHILS NFR BLD AUTO: 0.2 % — SIGNIFICANT CHANGE UP (ref 0–2)
BILIRUB UR-MCNC: NEGATIVE — SIGNIFICANT CHANGE UP
COLOR SPEC: YELLOW — SIGNIFICANT CHANGE UP
DIFF PNL FLD: NEGATIVE — SIGNIFICANT CHANGE UP
EOSINOPHIL # BLD AUTO: 0 K/UL — SIGNIFICANT CHANGE UP (ref 0–0.5)
EOSINOPHIL NFR BLD AUTO: 0 % — SIGNIFICANT CHANGE UP (ref 0–6)
EPI CELLS # UR: 3 /HPF — SIGNIFICANT CHANGE UP (ref 0–5)
FIBRINOGEN PPP-MCNC: 373 MG/DL — SIGNIFICANT CHANGE UP (ref 200–465)
GLUCOSE UR QL: NEGATIVE — SIGNIFICANT CHANGE UP
HCT VFR BLD CALC: 34.6 % — SIGNIFICANT CHANGE UP (ref 34.5–45)
HGB BLD-MCNC: 10.9 G/DL — LOW (ref 11.5–15.5)
HYALINE CASTS # UR AUTO: 2 /LPF — SIGNIFICANT CHANGE UP (ref 0–7)
IANC: 3.16 K/UL — SIGNIFICANT CHANGE UP (ref 1.8–7.4)
IMM GRANULOCYTES NFR BLD AUTO: 0.2 % — SIGNIFICANT CHANGE UP (ref 0–0.9)
INR BLD: 0.96 RATIO — SIGNIFICANT CHANGE UP (ref 0.88–1.16)
KETONES UR-MCNC: ABNORMAL
LEUKOCYTE ESTERASE UR-ACNC: NEGATIVE — SIGNIFICANT CHANGE UP
LYMPHOCYTES # BLD AUTO: 0.77 K/UL — LOW (ref 1–3.3)
LYMPHOCYTES # BLD AUTO: 16.6 % — SIGNIFICANT CHANGE UP (ref 13–44)
MCHC RBC-ENTMCNC: 25.5 PG — LOW (ref 27–34)
MCHC RBC-ENTMCNC: 31.5 GM/DL — LOW (ref 32–36)
MCV RBC AUTO: 80.8 FL — SIGNIFICANT CHANGE UP (ref 80–100)
MONOCYTES # BLD AUTO: 0.69 K/UL — SIGNIFICANT CHANGE UP (ref 0–0.9)
MONOCYTES NFR BLD AUTO: 14.9 % — HIGH (ref 2–14)
NEUTROPHILS # BLD AUTO: 3.16 K/UL — SIGNIFICANT CHANGE UP (ref 1.8–7.4)
NEUTROPHILS NFR BLD AUTO: 68.1 % — SIGNIFICANT CHANGE UP (ref 43–77)
NITRITE UR-MCNC: NEGATIVE — SIGNIFICANT CHANGE UP
NRBC # BLD: 0 /100 WBCS — SIGNIFICANT CHANGE UP (ref 0–0)
NRBC # FLD: 0 K/UL — SIGNIFICANT CHANGE UP (ref 0–0)
PH UR: 6 — SIGNIFICANT CHANGE UP (ref 5–8)
PLATELET # BLD AUTO: 198 K/UL — SIGNIFICANT CHANGE UP (ref 150–400)
PROT UR-MCNC: ABNORMAL
PROTHROM AB SERPL-ACNC: 11.1 SEC — SIGNIFICANT CHANGE UP (ref 10.5–13.4)
RBC # BLD: 4.28 M/UL — SIGNIFICANT CHANGE UP (ref 3.8–5.2)
RBC # FLD: 13.6 % — SIGNIFICANT CHANGE UP (ref 10.3–14.5)
RBC CASTS # UR COMP ASSIST: 1 /HPF — SIGNIFICANT CHANGE UP (ref 0–4)
SP GR SPEC: 1.02 — SIGNIFICANT CHANGE UP (ref 1.01–1.05)
UROBILINOGEN FLD QL: ABNORMAL
WBC # BLD: 4.64 K/UL — SIGNIFICANT CHANGE UP (ref 3.8–10.5)
WBC # FLD AUTO: 4.64 K/UL — SIGNIFICANT CHANGE UP (ref 3.8–10.5)
WBC UR QL: 3 /HPF — SIGNIFICANT CHANGE UP (ref 0–5)

## 2022-12-21 PROCEDURE — 0502F SUBSEQUENT PRENATAL CARE: CPT

## 2022-12-21 RX ORDER — OSELTAMIVIR PHOSPHATE 75 MG/1
75 CAPSULE ORAL TWICE DAILY
Qty: 10 | Refills: 0 | Status: ACTIVE | COMMUNITY
Start: 2022-12-21 | End: 1900-01-01

## 2022-12-21 RX ORDER — LEVETIRACETAM 250 MG/1
1000 TABLET, FILM COATED ORAL
Refills: 0 | Status: DISCONTINUED | OUTPATIENT
Start: 2022-12-22 | End: 2022-12-22

## 2022-12-21 RX ORDER — CITRIC ACID/SODIUM CITRATE 300-500 MG
15 SOLUTION, ORAL ORAL EVERY 6 HOURS
Refills: 0 | Status: DISCONTINUED | OUTPATIENT
Start: 2022-12-21 | End: 2022-12-22

## 2022-12-21 RX ORDER — LAMOTRIGINE 25 MG/1
200 TABLET, ORALLY DISINTEGRATING ORAL ONCE
Refills: 0 | Status: COMPLETED | OUTPATIENT
Start: 2022-12-22 | End: 2022-12-22

## 2022-12-21 RX ORDER — CHLORHEXIDINE GLUCONATE 213 G/1000ML
1 SOLUTION TOPICAL ONCE
Refills: 0 | Status: COMPLETED | OUTPATIENT
Start: 2022-12-21 | End: 2022-12-22

## 2022-12-21 RX ORDER — INFLUENZA VIRUS VACCINE 15; 15; 15; 15 UG/.5ML; UG/.5ML; UG/.5ML; UG/.5ML
0.5 SUSPENSION INTRAMUSCULAR ONCE
Refills: 0 | Status: DISCONTINUED | OUTPATIENT
Start: 2022-12-21 | End: 2022-12-25

## 2022-12-21 RX ORDER — SODIUM CHLORIDE 9 MG/ML
1000 INJECTION, SOLUTION INTRAVENOUS
Refills: 0 | Status: DISCONTINUED | OUTPATIENT
Start: 2022-12-21 | End: 2022-12-22

## 2022-12-21 RX ORDER — OXYTOCIN 10 UNIT/ML
333.33 VIAL (ML) INJECTION
Qty: 20 | Refills: 0 | Status: DISCONTINUED | OUTPATIENT
Start: 2022-12-21 | End: 2022-12-23

## 2022-12-21 RX ORDER — OXYTOCIN 10 UNIT/ML
1 VIAL (ML) INJECTION
Qty: 30 | Refills: 0 | Status: DISCONTINUED | OUTPATIENT
Start: 2022-12-21 | End: 2022-12-22

## 2022-12-21 NOTE — OB PROVIDER H&P - HISTORY OF PRESENT ILLNESS
HPI: Pt is a 31 yo  @40.4 wks, ALEX: 22, presenting as an induction of labor secondary to cHTN. Patient desires TOLAC. Patient counseled in extent by MD Stout and MD Marina on TOLAC. Patient previously scheduled for repeat c/s on 22. Per patient, she tested flu A + on 22, declined Tamiflu. At this time pt is only complaining of a cough and sore throat. Patient denies headache, blurry vision, epigastric pain, visual disturbances, and n/v. Endorses positive fetal movement, denies LOF/VB/CTX.     PNC complicated by  -cHTN   -IUGR (8th %, AC 2%, UAD wnl) per M report on 22  -resolved polyhydramnios   -complex partial seizure disorder    GBS negative  Covid negative  EFW: 3065g      OBHx:  Primary c/s- 2018 6#11oz (arrest of dilation @ 4cm)   SAB x1 no D&C  GynHx: denies hx of fibroids, cysts, abnormal Pap smears or STDs, Patient states she has warts, denies HPV. (No visible warts on exam)   PMHx: Complex partial seizure dx 2021- Keppra 1000mg 2x/day (last dose 9pm ), Lamotrigine 200mg daily (9pm ) Last seizure 2021  Chronic HTN dx - No meds   UTI- Cephalexin 200mg qhs   PSHx: Caesarean section 2018  Partial tonsillectomy   Med: PNV, keppra 1000mg 2x/day, lamotrigine 200mg daily, cephalexin 200mg qhs   All: NKDA  Psych: denies hx of depression or anxiety  SH: denies hx of smoking, drinking, or drug usage during the pregnancy    Vital Signs Last 24 Hrs  T(C): 36.8 (21 Dec 2022 22:28), Max: 36.8 (21 Dec 2022 22:28)  T(F): 98.24 (21 Dec 2022 22:28), Max: 98.24 (21 Dec 2022 22:28)  HR: 86 (21 Dec 2022 22:31) (86 - 86)  BP: 132/67 (21 Dec 2022 22:31) (132/67 - 132/67)  BP(mean): --  RR: 15 (21 Dec 2022 22:28) (15 - 15)  SpO2: --        Physical Exam:  Gen: NAD, AxOx3  CV: RRR  Resp: CTAB  Abd: soft, NT, gravid      SVE: /-3  FHT: Category 1  [place]: irregular   EFW: 3065g  Sono: Cephalic      A/P: Pt is a 31 yo  @40.4wks, ALEX: 22, who presents as an induction of labor secondary to cHTN. Patient desiring TOLAC.     1. Admit to LND. Routine Labs. IVF  2. IOL with cooks cervical balloon and pitocin 1x1  3. Fetus: Cat 1 tracing, Vertex, EFW 3065g . C/w EFM.  4. cHTN- HELLP labs ordered; seizure dx- Keppra/Lamotrigine ordered  5. IUGR (8th %, AC 2%, UAD wnl) 22  6. GBS negative 22  7. Pain: IV pain meds/epidural PRN  8. Covid negative   9. TOLAC- 2 units PRBC on hold     FRANCISCO Norton  Discussed with Dr. Stout        HPI: Pt is a 33 yo  @40.4 wks, ALEX: 22, presenting as an induction of labor secondary to cHTN. Patient desires TOLAC. Patient counseled in extent by MD Stout and MD Marina on TOLAC. Patient previously scheduled for repeat c/s on 22. Per patient, she tested flu A + on 22, declined Tamiflu. At this time pt is only complaining of a cough and sore throat. Patient denies headache, blurry vision, epigastric pain, visual disturbances, and n/v. Endorses positive fetal movement, denies LOF/VB/CTX.     PNC complicated by  -cHTN   -IUGR (8th %, AC 2%, UAD wnl) per M report on 22  -resolved polyhydramnios   -complex partial seizure disorder    GBS negative  Covid negative  EFW: 3005g      OBHx:  Primary c/s- 2018 6#11oz (arrest of dilation @ 4cm)   SAB x1 no D&C  GynHx: denies hx of fibroids, cysts, abnormal Pap smears or STDs, Patient states she has warts, denies HPV. (No visible warts on exam)   PMHx: Complex partial seizure dx 2021- Keppra 1000mg 2x/day (last dose 9pm ), Lamotrigine 200mg daily (9pm ) Last seizure 2021  Chronic HTN dx - No meds   UTI- Cephalexin 200mg qhs   PSHx: Caesarean section 2018  Partial tonsillectomy   Med: PNV, keppra 1000mg 2x/day, lamotrigine 200mg daily, cephalexin 200mg qhs   All: NKDA  Psych: denies hx of depression or anxiety  SH: denies hx of smoking, drinking, or drug usage during the pregnancy    Vital Signs Last 24 Hrs  T(C): 36.8 (21 Dec 2022 22:28), Max: 36.8 (21 Dec 2022 22:28)  T(F): 98.24 (21 Dec 2022 22:28), Max: 98.24 (21 Dec 2022 22:28)  HR: 86 (21 Dec 2022 22:31) (86 - 86)  BP: 132/67 (21 Dec 2022 22:31) (132/67 - 132/67)  BP(mean): --  RR: 15 (21 Dec 2022 22:28) (15 - 15)  SpO2: --        Physical Exam:  Gen: NAD, AxOx3  CV: RRR  Resp: CTAB  Abd: soft, NT, gravid      SVE: /-3  FHT: Category 1  [place]: irregular   EFW: 3005g  Sono: Cephalic      A/P: Pt is a 33 yo  @40.4wks, ALEX: 22, who presents as an induction of labor secondary to cHTN. Patient desiring TOLAC.     1. Admit to LND. Routine Labs. IVF  2. IOL with cooks cervical balloon and pitocin 1x1  3. Fetus: Cat 1 tracing, Vertex, EFW 3065g . C/w EFM.  4. cHTN- HELLP labs ordered; seizure dx- Keppra/Lamotrigine ordered  5. IUGR (8th %, AC 2%, UAD wnl) 22  6. GBS negative 22  7. Pain: IV pain meds/epidural PRN  8. Covid negative   9. TOLAC- 2 units PRBC on hold     FRANCISCO Norton  Discussed with Dr. Stout        HPI: Pt is a 31 yo  @40.4 wks, ALEX: 22, presenting as an induction of labor secondary to cHTN. Patient desires TOLAC. Patient counseled in extent by MD Stout and MD Marina on TOLAC. Patient previously scheduled for repeat c/s on 22. Per patient, she tested Flu A + on 22, declined Tamiflu. At this time pt is only complaining of a cough and sore throat. Patient denies headache, blurry vision, epigastric pain, visual disturbances, and n/v. Endorses positive fetal movement, denies LOF/VB/CTX.     PNC complicated by  -cHTN (130-140s/70-80's per pt)  -IUGR (8th %, AC 2%, UAD wnl) per MFM report on 22  -resolved polyhydramnios   -complex partial seizure disorder- Keppra 1000mg 2x/day, Lamotrigine 200mg daily  -Chronic UTI in this pregnancy- Cephalexin 200mg qhs     GBS negative  Covid negative  EFW: 3005g      OBHx:  Primary c/s- 2018 6#11oz (arrest of dilation @ 4cm)   SAB x1 no D&C  GynHx: denies hx of fibroids, cysts, abnormal Pap smears or STDs, Patient states she has warts, denies HPV. (No visible warts on exam)   PMHx: Complex partial seizure dx 2021- Keppra 1000mg 2x/day (last dose 9pm ), Lamotrigine 200mg daily (9pm ) Last seizure 2021  Chronic HTN dx - No meds   Chronic UTI in this pregnancy- Cephalexin 200mg qhs   PSHx: Caesarean section 2018  Partial tonsillectomy   Med: PNV, Keppra 1000mg 2x/day, lamotrigine 200mg daily, cephalexin 200mg qhs   All: NKDA  Psych: denies hx of depression or anxiety  SH: denies hx of smoking, drinking, or drug usage during the pregnancy    Vital Signs Last 24 Hrs  T(C): 36.8 (21 Dec 2022 22:28), Max: 36.8 (21 Dec 2022 22:28)  T(F): 98.24 (21 Dec 2022 22:28), Max: 98.24 (21 Dec 2022 22:28)  HR: 86 (21 Dec 2022 22:31) (86 - 86)  BP: 132/67 (21 Dec 2022 22:31) (132/67 - 132/67)  BP(mean): --  RR: 15 (21 Dec 2022 22:28) (15 - 15)  SpO2: --        Physical Exam:  Gen: NAD, AxOx3  CV: RRR  Resp: CTAB  Abd: soft, NT, gravid      SVE: /-3  FHT: Category 1  [place]: irregular   EFW: 3005g  Sono: Cephalic      A/P: Pt is a 31 yo  @40.4wks, ALEX: 22, who presents as an induction of labor secondary to cHTN. Patient desiring TOLAC.     1. Admit to LND. Routine Labs. IVF  2. IOL with cooks cervical balloon and pitocin 1x1  3. Fetus: Cat 1 tracing, Vertex, EFW 3065g . C/w EFM.  4. cHTN- HELLP labs ordered; seizure dx- Keppra/Lamotrigine ordered  5. IUGR (8th %, AC 2%, UAD wnl) 22  6. GBS negative 22  7. Pain: IV pain meds/epidural PRN  8. Covid negative   9. TOLAC- 2 units PRBC on hold; patient counseled in extend by MD Stout/MD Salas Norton, NP  Discussed with Dr. Stout

## 2022-12-21 NOTE — OB RN PATIENT PROFILE - FALL HARM RISK - UNIVERSAL INTERVENTIONS
Bed in lowest position, wheels locked, appropriate side rails in place/Call bell, personal items and telephone in reach/Instruct patient to call for assistance before getting out of bed or chair/Non-slip footwear when patient is out of bed/[place] to call system/Physically safe environment - no spills, clutter or unnecessary equipment/Purposeful Proactive Rounding/Room/bathroom lighting operational, light cord in reach

## 2022-12-21 NOTE — CHART NOTE - NSCHARTNOTEFT_GEN_A_CORE
PA  NOTE PA  NOTE  32 year old female  P1 at 40.4 weeks  who was seen 12/20 and signed out AMA    declined IOL  and had cancelled CS  that was scheduled     Lab review for RVP PANEL    FLU  A POSITIVE       pt called with results   and stated that she is coming in tonight for IOL     confirmed with pharmacy pt can start Tamiflu  within the time frame of 5 days   and pt counselled and agrees to start Tamiflu  for symptoms      pt advised to satrt Tamiflu when admitted        Juli AUSTIN

## 2022-12-21 NOTE — CHART NOTE - NSCHARTNOTEFT_GEN_A_CORE
OB Attg  Patient counseled on TOLAC vs elective rpt C/s. Decisions made for   TOLAC. Consent signed. Procedure including cervical balloon and pitocin discussed.   Discussed risks of labor and induction including fetal heart abnormalities, tachysystole,   arrest of labor, as well as uterine rupture requiring immediate  delivery.   Consent signed with patient. Given opportunity to ask questions. Plan to obtain continuous  fetal monitoring and then start low dose pitocin.  Tory Stout MD

## 2022-12-21 NOTE — OB PROVIDER H&P - NSLOWPPHRISK_OBGYN_A_OB
[place] Pregnancy/Less than or equal to 4 previous vaginal births/No known bleeding disorder/No history of postpartum hemorrhage/No other PPH risks indicated

## 2022-12-21 NOTE — OB PROVIDER H&P - NSICDXPASTMEDICALHX_GEN_ALL_CORE_FT
PAST MEDICAL HISTORY:  History of seizures "partial complex seizures" -- last bout 2021    HTN (hypertension) DENIES HYPERTENSION. States her BP will fluctuate     (normal spontaneous vaginal delivery)     UTI (urinary tract infection) takes medication daily

## 2022-12-22 ENCOUNTER — RESULT REVIEW (OUTPATIENT)
Age: 32
End: 2022-12-22

## 2022-12-22 LAB
ALBUMIN SERPL ELPH-MCNC: 2.7 G/DL — LOW (ref 3.3–5)
ALBUMIN SERPL ELPH-MCNC: 3.2 G/DL — LOW (ref 3.3–5)
ALP SERPL-CCNC: 133 U/L — HIGH (ref 40–120)
ALP SERPL-CCNC: 150 U/L — HIGH (ref 40–120)
ALT FLD-CCNC: 12 U/L — SIGNIFICANT CHANGE UP (ref 4–33)
ALT FLD-CCNC: 7 U/L — SIGNIFICANT CHANGE UP (ref 4–33)
ANION GAP SERPL CALC-SCNC: 10 MMOL/L — SIGNIFICANT CHANGE UP (ref 7–14)
ANION GAP SERPL CALC-SCNC: 11 MMOL/L — SIGNIFICANT CHANGE UP (ref 7–14)
APTT BLD: 34.1 SEC — SIGNIFICANT CHANGE UP (ref 27–36.3)
AST SERPL-CCNC: 16 U/L — SIGNIFICANT CHANGE UP (ref 4–32)
AST SERPL-CCNC: 21 U/L — SIGNIFICANT CHANGE UP (ref 4–32)
BASOPHILS # BLD AUTO: 0.01 K/UL — SIGNIFICANT CHANGE UP (ref 0–0.2)
BASOPHILS NFR BLD AUTO: 0.2 % — SIGNIFICANT CHANGE UP (ref 0–2)
BILIRUB SERPL-MCNC: 0.4 MG/DL — SIGNIFICANT CHANGE UP (ref 0.2–1.2)
BILIRUB SERPL-MCNC: 0.4 MG/DL — SIGNIFICANT CHANGE UP (ref 0.2–1.2)
BLD GP AB SCN SERPL QL: NEGATIVE — SIGNIFICANT CHANGE UP
BUN SERPL-MCNC: 4 MG/DL — LOW (ref 7–23)
BUN SERPL-MCNC: 7 MG/DL — SIGNIFICANT CHANGE UP (ref 7–23)
CALCIUM SERPL-MCNC: 8.1 MG/DL — LOW (ref 8.4–10.5)
CALCIUM SERPL-MCNC: 8.7 MG/DL — SIGNIFICANT CHANGE UP (ref 8.4–10.5)
CHLORIDE SERPL-SCNC: 105 MMOL/L — SIGNIFICANT CHANGE UP (ref 98–107)
CHLORIDE SERPL-SCNC: 106 MMOL/L — SIGNIFICANT CHANGE UP (ref 98–107)
CO2 SERPL-SCNC: 19 MMOL/L — LOW (ref 22–31)
CO2 SERPL-SCNC: 22 MMOL/L — SIGNIFICANT CHANGE UP (ref 22–31)
COVID-19 SPIKE DOMAIN AB INTERP: POSITIVE
COVID-19 SPIKE DOMAIN ANTIBODY RESULT: >250 U/ML — HIGH
CREAT ?TM UR-MCNC: 193 MG/DL — SIGNIFICANT CHANGE UP
CREAT SERPL-MCNC: 0.65 MG/DL — SIGNIFICANT CHANGE UP (ref 0.5–1.3)
CREAT SERPL-MCNC: 0.69 MG/DL — SIGNIFICANT CHANGE UP (ref 0.5–1.3)
EGFR: 118 ML/MIN/1.73M2 — SIGNIFICANT CHANGE UP
EGFR: 120 ML/MIN/1.73M2 — SIGNIFICANT CHANGE UP
EOSINOPHIL # BLD AUTO: 0 K/UL — SIGNIFICANT CHANGE UP (ref 0–0.5)
EOSINOPHIL NFR BLD AUTO: 0 % — SIGNIFICANT CHANGE UP (ref 0–6)
GLUCOSE SERPL-MCNC: 73 MG/DL — SIGNIFICANT CHANGE UP (ref 70–99)
GLUCOSE SERPL-MCNC: 77 MG/DL — SIGNIFICANT CHANGE UP (ref 70–99)
HCT VFR BLD CALC: 33.1 % — LOW (ref 34.5–45)
HGB BLD-MCNC: 10.3 G/DL — LOW (ref 11.5–15.5)
IANC: 4.67 K/UL — SIGNIFICANT CHANGE UP (ref 1.8–7.4)
IMM GRANULOCYTES NFR BLD AUTO: 0.3 % — SIGNIFICANT CHANGE UP (ref 0–0.9)
INR BLD: 0.98 RATIO — SIGNIFICANT CHANGE UP (ref 0.88–1.16)
LDH SERPL L TO P-CCNC: 208 U/L — SIGNIFICANT CHANGE UP (ref 135–225)
LDH SERPL L TO P-CCNC: 215 U/L — SIGNIFICANT CHANGE UP (ref 135–225)
LYMPHOCYTES # BLD AUTO: 0.62 K/UL — LOW (ref 1–3.3)
LYMPHOCYTES # BLD AUTO: 10.7 % — LOW (ref 13–44)
MCHC RBC-ENTMCNC: 25.4 PG — LOW (ref 27–34)
MCHC RBC-ENTMCNC: 31.1 GM/DL — LOW (ref 32–36)
MCV RBC AUTO: 81.7 FL — SIGNIFICANT CHANGE UP (ref 80–100)
MONOCYTES # BLD AUTO: 0.47 K/UL — SIGNIFICANT CHANGE UP (ref 0–0.9)
MONOCYTES NFR BLD AUTO: 8.1 % — SIGNIFICANT CHANGE UP (ref 2–14)
NEUTROPHILS # BLD AUTO: 4.67 K/UL — SIGNIFICANT CHANGE UP (ref 1.8–7.4)
NEUTROPHILS NFR BLD AUTO: 80.7 % — HIGH (ref 43–77)
NRBC # BLD: 0 /100 WBCS — SIGNIFICANT CHANGE UP (ref 0–0)
NRBC # FLD: 0 K/UL — SIGNIFICANT CHANGE UP (ref 0–0)
PLATELET # BLD AUTO: 171 K/UL — SIGNIFICANT CHANGE UP (ref 150–400)
POTASSIUM SERPL-MCNC: 3.7 MMOL/L — SIGNIFICANT CHANGE UP (ref 3.5–5.3)
POTASSIUM SERPL-MCNC: 4 MMOL/L — SIGNIFICANT CHANGE UP (ref 3.5–5.3)
POTASSIUM SERPL-SCNC: 3.7 MMOL/L — SIGNIFICANT CHANGE UP (ref 3.5–5.3)
POTASSIUM SERPL-SCNC: 4 MMOL/L — SIGNIFICANT CHANGE UP (ref 3.5–5.3)
PROT ?TM UR-MCNC: 37 MG/DL — SIGNIFICANT CHANGE UP
PROT SERPL-MCNC: 5.2 G/DL — LOW (ref 6–8.3)
PROT SERPL-MCNC: 6.2 G/DL — SIGNIFICANT CHANGE UP (ref 6–8.3)
PROT/CREAT UR-RTO: 0.2 RATIO — SIGNIFICANT CHANGE UP (ref 0–0.2)
PROTHROM AB SERPL-ACNC: 11.4 SEC — SIGNIFICANT CHANGE UP (ref 10.5–13.4)
RBC # BLD: 4.05 M/UL — SIGNIFICANT CHANGE UP (ref 3.8–5.2)
RBC # FLD: 13.6 % — SIGNIFICANT CHANGE UP (ref 10.3–14.5)
RH IG SCN BLD-IMP: POSITIVE — SIGNIFICANT CHANGE UP
SARS-COV-2 IGG+IGM SERPL QL IA: >250 U/ML — HIGH
SARS-COV-2 IGG+IGM SERPL QL IA: POSITIVE
SODIUM SERPL-SCNC: 136 MMOL/L — SIGNIFICANT CHANGE UP (ref 135–145)
SODIUM SERPL-SCNC: 137 MMOL/L — SIGNIFICANT CHANGE UP (ref 135–145)
T PALLIDUM AB TITR SER: NEGATIVE — SIGNIFICANT CHANGE UP
URATE SERPL-MCNC: 4.9 MG/DL — SIGNIFICANT CHANGE UP (ref 2.5–7)
URATE SERPL-MCNC: 5.5 MG/DL — SIGNIFICANT CHANGE UP (ref 2.5–7)
WBC # BLD: 5.79 K/UL — SIGNIFICANT CHANGE UP (ref 3.8–10.5)
WBC # FLD AUTO: 5.79 K/UL — SIGNIFICANT CHANGE UP (ref 3.8–10.5)

## 2022-12-22 PROCEDURE — 88307 TISSUE EXAM BY PATHOLOGIST: CPT | Mod: 26

## 2022-12-22 DEVICE — SURGICEL FIBRILLAR 1 X 2": Type: IMPLANTABLE DEVICE | Status: FUNCTIONAL

## 2022-12-22 RX ORDER — DIPHENHYDRAMINE HCL 50 MG
25 CAPSULE ORAL EVERY 6 HOURS
Refills: 0 | Status: DISCONTINUED | OUTPATIENT
Start: 2022-12-22 | End: 2022-12-25

## 2022-12-22 RX ORDER — IBUPROFEN 200 MG
600 TABLET ORAL EVERY 6 HOURS
Refills: 0 | Status: COMPLETED | OUTPATIENT
Start: 2022-12-22 | End: 2023-11-20

## 2022-12-22 RX ORDER — OXYCODONE HYDROCHLORIDE 5 MG/1
10 TABLET ORAL
Refills: 0 | Status: DISCONTINUED | OUTPATIENT
Start: 2022-12-22 | End: 2022-12-22

## 2022-12-22 RX ORDER — FAMOTIDINE 10 MG/ML
20 INJECTION INTRAVENOUS ONCE
Refills: 0 | Status: COMPLETED | OUTPATIENT
Start: 2022-12-22 | End: 2022-12-22

## 2022-12-22 RX ORDER — ONDANSETRON 8 MG/1
4 TABLET, FILM COATED ORAL EVERY 6 HOURS
Refills: 0 | Status: DISCONTINUED | OUTPATIENT
Start: 2022-12-22 | End: 2022-12-22

## 2022-12-22 RX ORDER — ERTAPENEM SODIUM 1 G/1
INJECTION, POWDER, LYOPHILIZED, FOR SOLUTION INTRAMUSCULAR; INTRAVENOUS
Refills: 0 | Status: DISCONTINUED | OUTPATIENT
Start: 2022-12-22 | End: 2022-12-22

## 2022-12-22 RX ORDER — SODIUM CHLORIDE 9 MG/ML
1000 INJECTION, SOLUTION INTRAVENOUS ONCE
Refills: 0 | Status: COMPLETED | OUTPATIENT
Start: 2022-12-22 | End: 2022-12-22

## 2022-12-22 RX ORDER — MAGNESIUM HYDROXIDE 400 MG/1
30 TABLET, CHEWABLE ORAL
Refills: 0 | Status: DISCONTINUED | OUTPATIENT
Start: 2022-12-22 | End: 2022-12-25

## 2022-12-22 RX ORDER — NALOXONE HYDROCHLORIDE 4 MG/.1ML
0.1 SPRAY NASAL
Refills: 0 | Status: DISCONTINUED | OUTPATIENT
Start: 2022-12-22 | End: 2022-12-22

## 2022-12-22 RX ORDER — HYDROMORPHONE HYDROCHLORIDE 2 MG/ML
1 INJECTION INTRAMUSCULAR; INTRAVENOUS; SUBCUTANEOUS
Refills: 0 | Status: DISCONTINUED | OUTPATIENT
Start: 2022-12-22 | End: 2022-12-22

## 2022-12-22 RX ORDER — OXYCODONE HYDROCHLORIDE 5 MG/1
5 TABLET ORAL
Refills: 0 | Status: DISCONTINUED | OUTPATIENT
Start: 2022-12-22 | End: 2022-12-22

## 2022-12-22 RX ORDER — KETOROLAC TROMETHAMINE 30 MG/ML
30 SYRINGE (ML) INJECTION EVERY 6 HOURS
Refills: 0 | Status: DISCONTINUED | OUTPATIENT
Start: 2022-12-22 | End: 2022-12-23

## 2022-12-22 RX ORDER — BENZOCAINE AND MENTHOL 5; 1 G/100ML; G/100ML
1 LIQUID ORAL ONCE
Refills: 0 | Status: DISCONTINUED | OUTPATIENT
Start: 2022-12-22 | End: 2022-12-22

## 2022-12-22 RX ORDER — LANOLIN
1 OINTMENT (GRAM) TOPICAL EVERY 6 HOURS
Refills: 0 | Status: DISCONTINUED | OUTPATIENT
Start: 2022-12-22 | End: 2022-12-25

## 2022-12-22 RX ORDER — SIMETHICONE 80 MG/1
80 TABLET, CHEWABLE ORAL EVERY 4 HOURS
Refills: 0 | Status: DISCONTINUED | OUTPATIENT
Start: 2022-12-22 | End: 2022-12-25

## 2022-12-22 RX ORDER — SODIUM CHLORIDE 9 MG/ML
1000 INJECTION, SOLUTION INTRAVENOUS
Refills: 0 | Status: DISCONTINUED | OUTPATIENT
Start: 2022-12-22 | End: 2022-12-23

## 2022-12-22 RX ORDER — CITRIC ACID/SODIUM CITRATE 300-500 MG
30 SOLUTION, ORAL ORAL ONCE
Refills: 0 | Status: COMPLETED | OUTPATIENT
Start: 2022-12-22 | End: 2022-12-22

## 2022-12-22 RX ORDER — ERTAPENEM SODIUM 1 G/1
1000 INJECTION, POWDER, LYOPHILIZED, FOR SOLUTION INTRAMUSCULAR; INTRAVENOUS EVERY 24 HOURS
Refills: 0 | Status: DISCONTINUED | OUTPATIENT
Start: 2022-12-23 | End: 2022-12-24

## 2022-12-22 RX ORDER — OXYCODONE HYDROCHLORIDE 5 MG/1
5 TABLET ORAL
Refills: 0 | Status: COMPLETED | OUTPATIENT
Start: 2022-12-22 | End: 2022-12-29

## 2022-12-22 RX ORDER — TETANUS TOXOID, REDUCED DIPHTHERIA TOXOID AND ACELLULAR PERTUSSIS VACCINE, ADSORBED 5; 2.5; 8; 8; 2.5 [IU]/.5ML; [IU]/.5ML; UG/.5ML; UG/.5ML; UG/.5ML
0.5 SUSPENSION INTRAMUSCULAR ONCE
Refills: 0 | Status: DISCONTINUED | OUTPATIENT
Start: 2022-12-22 | End: 2022-12-25

## 2022-12-22 RX ORDER — ERTAPENEM SODIUM 1 G/1
INJECTION, POWDER, LYOPHILIZED, FOR SOLUTION INTRAMUSCULAR; INTRAVENOUS
Refills: 0 | Status: DISCONTINUED | OUTPATIENT
Start: 2022-12-22 | End: 2022-12-24

## 2022-12-22 RX ORDER — ERTAPENEM SODIUM 1 G/1
1000 INJECTION, POWDER, LYOPHILIZED, FOR SOLUTION INTRAMUSCULAR; INTRAVENOUS ONCE
Refills: 0 | Status: COMPLETED | OUTPATIENT
Start: 2022-12-22 | End: 2022-12-22

## 2022-12-22 RX ORDER — OXYCODONE HYDROCHLORIDE 5 MG/1
5 TABLET ORAL ONCE
Refills: 0 | Status: DISCONTINUED | OUTPATIENT
Start: 2022-12-22 | End: 2022-12-25

## 2022-12-22 RX ORDER — OXYTOCIN 10 UNIT/ML
333.33 VIAL (ML) INJECTION
Qty: 20 | Refills: 0 | Status: DISCONTINUED | OUTPATIENT
Start: 2022-12-22 | End: 2022-12-23

## 2022-12-22 RX ORDER — ERTAPENEM SODIUM 1 G/1
1000 INJECTION, POWDER, LYOPHILIZED, FOR SOLUTION INTRAMUSCULAR; INTRAVENOUS ONCE
Refills: 0 | Status: DISCONTINUED | OUTPATIENT
Start: 2022-12-22 | End: 2022-12-22

## 2022-12-22 RX ORDER — ACETAMINOPHEN 500 MG
975 TABLET ORAL
Refills: 0 | Status: DISCONTINUED | OUTPATIENT
Start: 2022-12-22 | End: 2022-12-25

## 2022-12-22 RX ORDER — HEPARIN SODIUM 5000 [USP'U]/ML
5000 INJECTION INTRAVENOUS; SUBCUTANEOUS EVERY 12 HOURS
Refills: 0 | Status: DISCONTINUED | OUTPATIENT
Start: 2022-12-22 | End: 2022-12-25

## 2022-12-22 RX ORDER — CEPHALEXIN 500 MG
250 CAPSULE ORAL AT BEDTIME
Refills: 0 | Status: DISCONTINUED | OUTPATIENT
Start: 2022-12-22 | End: 2022-12-22

## 2022-12-22 RX ADMIN — SODIUM CHLORIDE 125 MILLILITER(S): 9 INJECTION, SOLUTION INTRAVENOUS at 00:18

## 2022-12-22 RX ADMIN — Medication 250 MILLIGRAM(S): at 21:02

## 2022-12-22 RX ADMIN — Medication 1 MILLIUNIT(S)/MIN: at 00:19

## 2022-12-22 RX ADMIN — Medication 30 MILLILITER(S): at 21:11

## 2022-12-22 RX ADMIN — CHLORHEXIDINE GLUCONATE 1 APPLICATION(S): 213 SOLUTION TOPICAL at 00:18

## 2022-12-22 RX ADMIN — Medication 200 MILLIGRAM(S): at 16:08

## 2022-12-22 RX ADMIN — Medication 30 MILLIGRAM(S): at 23:34

## 2022-12-22 RX ADMIN — FAMOTIDINE 20 MILLIGRAM(S): 10 INJECTION INTRAVENOUS at 21:11

## 2022-12-22 RX ADMIN — SODIUM CHLORIDE 1000 MILLILITER(S): 9 INJECTION, SOLUTION INTRAVENOUS at 23:40

## 2022-12-22 RX ADMIN — LEVETIRACETAM 1000 MILLIGRAM(S): 250 TABLET, FILM COATED ORAL at 20:38

## 2022-12-22 RX ADMIN — LAMOTRIGINE 200 MILLIGRAM(S): 25 TABLET, ORALLY DISINTEGRATING ORAL at 20:38

## 2022-12-22 RX ADMIN — LEVETIRACETAM 1000 MILLIGRAM(S): 250 TABLET, FILM COATED ORAL at 09:34

## 2022-12-22 NOTE — OB PROVIDER LABOR PROGRESS NOTE - ASSESSMENT
TOLAC  IUGR infant  unchanged cervical exam  now with moderate amount of vaginal bleeding, concerning for placental abruption    Rpt Exam at 6pm with more bleeding per vagina noted and unchanged exam    Advised Rpt C/S given cervical exam being remote from delivery and concern for placental abruption.  Pt to decide.   Understands R/A/B of C/S including but not limited to infection, bleeding and injury to bladder/bowel
TOLAC iol, cHTN, unchanged  IUPC, cont Pitocin augmentation  Overall reassuring fetal status  Reassess in 2-4 hrs/prn  Dr. Webb updated  PELON Gage
#Labor   - Will shut off Oxytocin given cat 2 tracing     #Fetal Wellbeing   - Cat 2. Will attempt to resuscitate via positioning and shutting off Oxytocin      # Issues   - cHTN  - On Lamictal and Keppra for seizure d/o     #Pain Control   - w/ Epi    Elmer Dawn, PGY-1    d/w Dr. Webb 
TOLAC s/p CB  Restart Pitocin @ 1mu/min  Cont current management  AROM with next exam in 2 hrs  Dr. Webb updated  PELON Gage
Cont Pitocin aug  Reposition with peanut ball  Consider AROM next exam if further descent, vtx too high during this exam  Reassuring fetal status  Pain mgt effective  Dr. Webb updated  PELON Gage

## 2022-12-22 NOTE — OB PROVIDER DELIVERY SUMMARY - NSSELHIDDEN_OBGYN_ALL_OB_FT
[NS_[email or web address]_ALL_OB_FT:[id number]],[NS_DeliveryAssist1_OBGYN_ALL_OB_FT:[id number]],[NS_DeliveryRN_OBGYN_ALL_OB_FT:[id number]]

## 2022-12-22 NOTE — OB RN INTRAOPERATIVE NOTE - NS_DECISIONCESAREAN_OBGYN_ALL_OB_DT
DISPLAY PLAN FREE TEXT DISPLAY PLAN FREE TEXT DISPLAY PLAN FREE TEXT DISPLAY PLAN FREE TEXT DISPLAY PLAN FREE TEXT DISPLAY PLAN FREE TEXT DISPLAY PLAN FREE TEXT DISPLAY PLAN FREE TEXT DISPLAY PLAN FREE TEXT DISPLAY PLAN FREE TEXT 22-Dec-2022 21:05

## 2022-12-22 NOTE — OB RN DELIVERY SUMMARY - NS_CORDVESSELS_OBGYN_ALL_OB
OFFICE VISIT      Patient: Geraldo Schulte Date of Service: 2019   : 1964 MRN: [id number]     SUBJECTIVE:     Chief Complaint   Patient presents with   â¢ Follow-up     Seeing Dr Gerald Kenney through [facility], and has appt w/ Psychiatrist.   â¢ Pre-Op Exam     Nacho  Mesleh, Hernia sx       HISTORY OF PRESENT ILLNESS:  Geraldo Schulte is a 47year old female who presents today for preoperative clearance. Abdominal issues:  She is scheduled for hiatal hernia repair with Dr. Porsha Chow, next week. She is here for medical clearance. She has no cardiac complaints, chest pain, or shortness of breath. She is requesting for blood work and EKG. She does have some slight nausea after with anesthesia but again no significant adverse reactions to anesthesia. Her main reason of doing the surgery is chronic upper abdominal pain with some notable hiatal hernia. She has had an EGD and colonoscopy done recently. It was advised due to her hiatal hernia and some evidence of Dorado's esophagus, she was recommended doing other EGD in 3 years and get the hiatal hernia repaired. Her colonoscopy is up to date. Her colonoscopy was done back in , and she is not due until , which will be next year. Thyroid issues:  She is also here to reconcile her medications. Depression with anxiety:  Her anxiety has worsened. She is under the care of [address] [place] [address] mental health clinic. She is in the process of getting a psychologist which will start on . She also got an appointment to see a psychiatrist but the appointment is not until December. She did see a psychiatrist at [address] [place] [address]. They did start her on Wellbutrin 150 mg daily which she takes with her Celexa which is helping her. She still takes Xanax more than twice a day, but she has been advised that she cannot take more than 60 tablets per month. Last time she was complaining of being tired and fatigue. She has no headaches or dizziness.      PAST MEDICAL HISTORY:  No past medical history on file. MEDICATIONS:  Current Outpatient Medications   Medication Sig   â¢ buPROPion (WELLBUTRIN XL) 150 MG 24 hr tablet Take 1 tablet by mouth daily. â¢ ALPRAZolam (XANAX) 1 MG tablet Take 1 tablet by mouth 2 times daily as needed for Anxiety. â¢ HYDROcodone-acetaminophen (NORCO)  MG per tablet Take 1 tablet by mouth every 8 hours as needed for Pain. â¢ butalbital-APAP-caffeine (FIORICET) -40 MG per tablet 1 tablet po every 12 hours as needed for headache   â¢ fluticasone (FLONASE) 50 MCG/ACT nasal spray Spray 2 sprays in each nostril daily. â¢ ondansetron (ZOFRAN) 4 MG tablet Take 1 tablet by mouth every 8 hours as needed for Nausea. â¢ citalopram (CELEXA) 40 MG tablet Take 1 tablet by mouth daily. â¢ calcitRIOL (ROCALTROL) 0.5 MCG capsule TAKE 6 CAPSULES BY MOUTH ONCE DAILY   â¢ levothyroxine (SYNTHROID, LEVOTHROID) 175 MCG tablet Take 1 tablet by mouth daily. â¢ albuterol (PROAIR HFA) 108 (90 Base) MCG/ACT inhaler 1-2 puffs INH every 4-6 hours as needed for shortness of breath. â¢ gabapentin (NEURONTIN) 600 MG tablet Take 1 tablet by mouth 3 times daily. â¢ omeprazole (PRILOSEC) 40 MG capsule Take by mouth every 12 hours. No current facility-administered medications for this visit.         ALLERGIES:  ALLERGIES:   Allergen Reactions   â¢ Calcium Acetate Other (See Comments)   â¢ Clarithromycin Other (See Comments)   â¢ Codeine Other (See Comments)   â¢ Sulfa Antibiotics Other (See Comments)       PAST SURGICAL HISTORY:  Past Surgical History:   Procedure Laterality Date   â¢ Appendectomy     â¢ Cholecystectomy     â¢ Hysterectomy     â¢ Knee arthroscopy w/ laser     â¢ Thyroid surgery     â¢ Tonsillectomy         FAMILY HISTORY:  Family History   Problem Relation Age of Onset   â¢ Anxiety disorder Mother        SOCIAL HISTORY:  Social History     Tobacco Use   Smoking Status Current Some Day Smoker   â¢ Packs/day: 0.00   Smokeless Tobacco Never Used   Tobacco "Comment    electric cigarette     Social History     Substance and Sexual Activity   Alcohol Use Yes       Review of Systems    Constitutional: Per HPI. Cardiovascular: Per HPI. Gastrointestinal: Per HPI. Neuro: Per HPI. Psychiatric: Per HPI. OBJECTIVE:     Visit Vitals  /69   Pulse 64   Temp 98.3 Â°F (36.8 Â°C)   Resp 20   Ht 5' 6"" (1.676 m)   Wt 109.8 kg (242 lb)   BMI 39.06 kg/mÂ²       Physical Exam    Constitutional: alert, in no acute distress and current vital signs reviewed. Head and Face: atraumatic, no deformities, normocephalic, normal facies. Eyes: no discharge, normal conjunctiva, no eyelid swelling, no ptosis and the sclerae were normal. pupils equal, round and reactive to light and accommodation, conjugate gaze and extraocular movements were intact. ENT: normal appearing outer ear, normal appearing nose. examination of the tympanic membrane showed normal landmarks, normal appearing external canal. nasal mucosa moist and pink, no nasal discharge. normal lips. oral mucosa pink and moist, no oral lesions. Neck: normal appearing neck, supple neck and no mass was seen. thyroid not enlarged and no thyroid nodules. Lymphatic: no lymphadenopathy. Pulmonary: no respiratory distress, normal respiratory rate and effort and no accessory muscle use. breath sounds clear to auscultation bilaterally. Cardiovascular: normal rate, no murmurs were heard, regular rhythm, normal S1 and normal S2. edema was not present in the lower extremities. Abdomen: Positive for tenderness along the mid of the gastric region. NO RT, no guarding. Musculoskeletal: Normal gait. Normal range of motion. There was no joint instability noted. Muscle strength and tone were normal.  Neurologic: cranial nerves grossly intact. No sensory deficits noted. No coordination deficits. Psychiatric:  Appears slightly anxious. Skin, Hair, Nails: normal skin color and pigmentation and no rash. normal skin turgor.  no clubbing " or cyanosis of the fingernails. DIAGNOSTIC STUDIES:   LAB RESULTS:    Lab Services on 09/10/2019   Component Date Value Ref Range Status   â¢ WBC 09/10/2019 8.8  4.2 - 11.0 K/mcL Final   â¢ RBC 09/10/2019 3.84* 4.00 - 5.20 mil/mcL Final   â¢ HGB 09/10/2019 10.4* 12.0 - 15.5 g/dL Final   â¢ HCT 09/10/2019 34.2* 36.0 - 46.5 % Final   â¢ MCV 09/10/2019 89.1  78.0 - 100.0 fl Final   â¢ MCH 09/10/2019 27.1  26.0 - 34.0 pg Final   â¢ MCHC 09/10/2019 30.4* 32.0 - 36.5 g/dL Final   â¢ RDW-CV 09/10/2019 17.2* 11.0 - 15.0 % Final   â¢ PLT 09/10/2019 310  140 - 450 K/mcL Final   â¢ NRBC 09/10/2019 0  0 /100 WBC Final   â¢ DIFF TYPE 09/10/2019 AUTOMATED DIFFERENTIAL   Final   â¢ Neutrophil 09/10/2019 34  % Final   â¢ LYMPH 09/10/2019 48  % Final   â¢ MONO 09/10/2019 9  % Final   â¢ EOSIN 09/10/2019 8  % Final   â¢ BASO 09/10/2019 1  % Final   â¢ Percent Immature Granuloctyes 09/10/2019 0  % Final   â¢ Absolute Neutrophil 09/10/2019 3.0  1.8 - 7.7 K/mcL Final   â¢ Absolute Lymph 09/10/2019 4.2* 1.0 - 4.0 K/mcL Final   â¢ Absolute Mono 09/10/2019 0.8  0.3 - 0.9 K/mcL Final   â¢ Absolute Eos 09/10/2019 0.7* 0.1 - 0.5 K/mcL Final   â¢ Absolute Baso 09/10/2019 0.1  0.0 - 0.3 K/mcL Final   â¢ Absolute Immature Granulocytes 09/10/2019 0.0  0 - 0.2 K/mcl Final       [unfilled]      ASSESSMENT AND PLAN:   This is a 47year old year-old female who presents with     1. Hiatal hernia    2. Chronic, continuous use of opioids    3. Pre-op evaluation    4. Hypothyroidism, unspecified type    5. Dorado's esophagus without dysplasia    6. Depression with anxiety      Hiatal hernia   Follow up with the GI surgeon, Dr. Antoine Bess. Chronic, continuous use of opioids   She also has chronic opiod use. Ordered urine for drug screening, refer to orders. We completed a pain contract with the patient. Pre-op evaluation   She is here for preoperative clearance prior to hiatal hernia repair. Her exam is unremarkable. Blood pressure is stable.    EKG showed normal sinus rhythm. She is CLEARED for surgery. Hypothyroidism, unspecified type `  Ordered labs, refer to orders. Increased the dose of Synthroid from 150 mg to 175 mg. Dorado's esophagus without dysplasia   Ordered repeat EGD, as directed per GI. Depression with anxiety   Managed by a psychologist and psychiatrist.   Refilled Wellbutrin until she can get in with the psychiatrist in December. No follow-ups on file. Instructions provided as documented in the AVS.  Medication use,effects and side effects discussed in detail with patient. The patient indicated understanding of the diagnosis and agreed with the plan of care. Medical compliance with plan discussed and risks of non-compliance reviewed. Patient education completed on disease process, etiology & prognosis. Patient expresses understanding of the plan. Proper usage and side effects of medications reviewed & discussed. Refer to orders. Return to clinic as clinically indicated as discussed with patient who verbalized understanding of & agreement with the plan.     Entered by [place] acting as scribe for Dr. Laura Connor MD 3

## 2022-12-22 NOTE — OB RN INTRAOPERATIVE NOTE - NSSELHIDDEN_OBGYN_ALL_OB_FT
[NS_DeliveryAttending1_OBGYN_ALL_OB_FT:[id number]],[NS_DeliveryAssist1_OBGYN_ALL_OB_FT:[id number]],[NS_DeliveryRN_OBGYN_ALL_OB_FT:[id number]==]

## 2022-12-22 NOTE — OB PROVIDER LABOR PROGRESS NOTE - NS_SUBJECTIVE/OBJECTIVE_OBGYN_ALL_OB_FT
Pt seen and examined, CB spontaneously fell out. Pt comfortable with epidural.  VSS  Cat 1   [place]: q 2-3  VE: 5/80/-2, bulging membranes
Pt seen and examined for labor progress, consideration of AROM  Comfortable with epidural  VSS  Cat 1   [place]: q2-4  VE: 5-6/80/-2 but without ctx -3
Pt denies c/o.  Feels intermittent mild abdominal pain
Pt seen and examined for labor progress, s/p SROM about 2 hours ago. Comfortable with epidural.  VSS  Cat 1   [place]: irreg  VE: unchanged, IUPC placed, copious amounts of clear fluid noted
PGY1 Labor & Delivery Progress Note     Pt examined @ 1608 due to cat2 tracing and to eval for cervical change     T(C): 36.8 (12-22-22 @ 09:59), Max: 36.9 (12-22-22 @ 06:21)  HR: 84 (12-22-22 @ 16:04) (72 - 111)  BP: 127/61 (12-22-22 @ 16:04) (101/57 - 140/77)  RR: 14 (12-21-22 @ 23:33) (14 - 15)  SpO2: 99% (12-22-22 @ 16:09) (89% - 100%)

## 2022-12-22 NOTE — OB RN DELIVERY SUMMARY - NS_LABORCHARACTER_OBGYN_ALL_OB
Internal electronic FM/External electronic FM Induction of labor-Medicinal/Augmentation of labor/Internal electronic FM/External electronic FM

## 2022-12-22 NOTE — OB NEONATOLOGY/PEDIATRICIAN DELIVERY SUMMARY - NSPEDSNEONOTESA_OBGYN_ALL_OB_FT
Peds called to OR for placental abruption. 40.5 wk female born via CS to a 31 y/o  mother.  Maternal history of chronic UTIs on daily cephalexin, complex partial seizure disorder on keppra and lamotrigine. Maternal labs include Blood Type B+ , HIV - , RPR NR , Rubella I , Hep B - , GBS - on , COVID -. ROM at 1305 on  with bloody fluids (ROM hours: 9H8M).  Baby emerged vigorous, crying, was w/d/s/s with APGARS of 8/9 . Resuscitation included: stimulation and bulb suction. Mom plans to initiate breastfeeding, declines Hep B vaccine.  Highest maternal temp: 38. EOS 0.71.    Physical Exam:  Gen: no acute distress, +grimace  HEENT:  anterior fontanel open soft and flat, nondysmoprhic facies, no cleft lip/palate, ears normal set, no ear pits or tags, nares clinically patent  Resp: Normal respiratory effort without grunting or retractions, good air entry b/l, clear to auscultation bilaterally  Cardio: Present S1/S2, regular rate and rhythm, no murmurs  Abd: soft, non tender, non distended, umbilical cord with 3 vessels  Neuro: +palmar and plantar grasp, +suck, +zhang, normal tone  Extremities: negative wagner and ortolani maneuvers, moving all extremities, no clavicular crepitus or stepoff  Skin: pink, warm  Genitals:Normal female anatomy, Ariel 1, anus patent

## 2022-12-22 NOTE — OB RN DELIVERY SUMMARY - NSSELHIDDEN_OBGYN_ALL_OB_FT
[NS_DeliveryAttending1_OBGYN_ALL_OB_FT:[id number]==],[NS_DeliveryAssist1_OBGYN_ALL_OB_FT:[id number]],[NS_DeliveryRN_OBGYN_ALL_OB_FT:[id number]==]

## 2022-12-22 NOTE — OB RN DELIVERY SUMMARY - NS_SEPSISRSKCALC_OBGYN_ALL_OB_FT
EOS calculated successfully. EOS Risk Factor: 0.5/1000 live births ([facility] national incidence); GA=40w5d; Temp=100.4; ROM=9.133; GBS='Negative'; Antibiotics='No antibiotics or any antibiotics < 2 hrs prior to birth'

## 2022-12-22 NOTE — OB PROVIDER DELIVERY SUMMARY - NSPROVIDERDELIVERYNOTE_OBGYN_ALL_OB_FT
repeat LTCS, concern for placental abruption, failed TOLAC  viable female infant, vertex presentation, Apgars 8/9, cord gasses sent  grossly normal fallopian tubes, uterus, and ovaries  uterus closed in 1 layer with vicryl  fibrillar placed atop hysterotomy     EBL: 500  IVF: 1200  UOP: 175    Dictation #    BASSEM Santana, PGY-2  w/ Dr. Marina repeat LTCS, concern for placental abruption, failed TOLAC  viable female infant, vertex presentation, Apgars 8/9, cord gasses sent  grossly normal fallopian tubes, uterus, and ovaries  uterus closed in 1 layer with vicryl  fibrillar placed atop hysterotomy     EBL: 500  IVF: 1200  UOP: 175    Dictation #[id number]    BASSEM Santana, PGY-2  w/ Dr. Marina

## 2022-12-23 ENCOUNTER — TRANSCRIPTION ENCOUNTER (OUTPATIENT)
Age: 32
End: 2022-12-23

## 2022-12-23 ENCOUNTER — APPOINTMENT (OUTPATIENT)
Dept: ANTEPARTUM | Facility: CLINIC | Age: 32
End: 2022-12-23

## 2022-12-23 LAB
BASOPHILS # BLD AUTO: 0.01 K/UL — SIGNIFICANT CHANGE UP (ref 0–0.2)
BASOPHILS NFR BLD AUTO: 0.1 % — SIGNIFICANT CHANGE UP (ref 0–2)
EOSINOPHIL # BLD AUTO: 0 K/UL — SIGNIFICANT CHANGE UP (ref 0–0.5)
EOSINOPHIL NFR BLD AUTO: 0 % — SIGNIFICANT CHANGE UP (ref 0–6)
HCT VFR BLD CALC: 32.7 % — LOW (ref 34.5–45)
HGB BLD-MCNC: 10.1 G/DL — LOW (ref 11.5–15.5)
IANC: 9.99 K/UL — HIGH (ref 1.8–7.4)
IMM GRANULOCYTES NFR BLD AUTO: 0.4 % — SIGNIFICANT CHANGE UP (ref 0–0.9)
LYMPHOCYTES # BLD AUTO: 0.53 K/UL — LOW (ref 1–3.3)
LYMPHOCYTES # BLD AUTO: 4.7 % — LOW (ref 13–44)
MCHC RBC-ENTMCNC: 25.1 PG — LOW (ref 27–34)
MCHC RBC-ENTMCNC: 30.9 GM/DL — LOW (ref 32–36)
MCV RBC AUTO: 81.3 FL — SIGNIFICANT CHANGE UP (ref 80–100)
MONOCYTES # BLD AUTO: 0.65 K/UL — SIGNIFICANT CHANGE UP (ref 0–0.9)
MONOCYTES NFR BLD AUTO: 5.8 % — SIGNIFICANT CHANGE UP (ref 2–14)
NEUTROPHILS # BLD AUTO: 9.99 K/UL — HIGH (ref 1.8–7.4)
NEUTROPHILS NFR BLD AUTO: 89 % — HIGH (ref 43–77)
NRBC # BLD: 0 /100 WBCS — SIGNIFICANT CHANGE UP (ref 0–0)
NRBC # FLD: 0 K/UL — SIGNIFICANT CHANGE UP (ref 0–0)
PLATELET # BLD AUTO: 203 K/UL — SIGNIFICANT CHANGE UP (ref 150–400)
RBC # BLD: 4.02 M/UL — SIGNIFICANT CHANGE UP (ref 3.8–5.2)
RBC # FLD: 13.4 % — SIGNIFICANT CHANGE UP (ref 10.3–14.5)
WBC # BLD: 11.22 K/UL — HIGH (ref 3.8–10.5)
WBC # FLD AUTO: 11.22 K/UL — HIGH (ref 3.8–10.5)

## 2022-12-23 PROCEDURE — 59515 CESAREAN DELIVERY: CPT | Mod: U9,UB,GC

## 2022-12-23 RX ORDER — LEVETIRACETAM 250 MG/1
1 TABLET, FILM COATED ORAL
Qty: 0 | Refills: 0 | DISCHARGE

## 2022-12-23 RX ORDER — IBUPROFEN 200 MG
600 TABLET ORAL EVERY 6 HOURS
Refills: 0 | Status: DISCONTINUED | OUTPATIENT
Start: 2022-12-23 | End: 2022-12-25

## 2022-12-23 RX ORDER — SENNA PLUS 8.6 MG/1
1 TABLET ORAL
Refills: 0 | Status: DISCONTINUED | OUTPATIENT
Start: 2022-12-23 | End: 2022-12-25

## 2022-12-23 RX ORDER — FERROUS SULFATE 325(65) MG
325 TABLET ORAL DAILY
Refills: 0 | Status: DISCONTINUED | OUTPATIENT
Start: 2022-12-23 | End: 2022-12-25

## 2022-12-23 RX ORDER — CEPHALEXIN 500 MG
250 CAPSULE ORAL DAILY
Refills: 0 | Status: DISCONTINUED | OUTPATIENT
Start: 2022-12-23 | End: 2022-12-23

## 2022-12-23 RX ORDER — LEVETIRACETAM 250 MG/1
1000 TABLET, FILM COATED ORAL
Refills: 0 | Status: DISCONTINUED | OUTPATIENT
Start: 2022-12-23 | End: 2022-12-25

## 2022-12-23 RX ORDER — LAMOTRIGINE 25 MG/1
200 TABLET, ORALLY DISINTEGRATING ORAL DAILY
Refills: 0 | Status: DISCONTINUED | OUTPATIENT
Start: 2022-12-23 | End: 2022-12-25

## 2022-12-23 RX ORDER — FERROUS SULFATE 325(65) MG
1 TABLET ORAL
Qty: 0 | Refills: 0 | DISCHARGE
Start: 2022-12-23

## 2022-12-23 RX ORDER — CEPHALEXIN 500 MG
250 CAPSULE ORAL AT BEDTIME
Refills: 0 | Status: DISCONTINUED | OUTPATIENT
Start: 2022-12-23 | End: 2022-12-24

## 2022-12-23 RX ORDER — LAMOTRIGINE 25 MG/1
1 TABLET, ORALLY DISINTEGRATING ORAL
Qty: 0 | Refills: 0 | DISCHARGE

## 2022-12-23 RX ORDER — CEPHALEXIN 500 MG
1 CAPSULE ORAL
Qty: 0 | Refills: 0 | DISCHARGE

## 2022-12-23 RX ORDER — ACETAMINOPHEN 500 MG
3 TABLET ORAL
Qty: 0 | Refills: 0 | DISCHARGE
Start: 2022-12-23

## 2022-12-23 RX ORDER — IBUPROFEN 200 MG
1 TABLET ORAL
Qty: 0 | Refills: 0 | DISCHARGE
Start: 2022-12-23

## 2022-12-23 RX ADMIN — HEPARIN SODIUM 5000 UNIT(S): 5000 INJECTION INTRAVENOUS; SUBCUTANEOUS at 05:45

## 2022-12-23 RX ADMIN — Medication 1 TABLET(S): at 15:35

## 2022-12-23 RX ADMIN — Medication 30 MILLIGRAM(S): at 19:06

## 2022-12-23 RX ADMIN — ERTAPENEM SODIUM 120 MILLIGRAM(S): 1 INJECTION, POWDER, LYOPHILIZED, FOR SOLUTION INTRAMUSCULAR; INTRAVENOUS at 22:16

## 2022-12-23 RX ADMIN — Medication 30 MILLIGRAM(S): at 12:39

## 2022-12-23 RX ADMIN — LAMOTRIGINE 200 MILLIGRAM(S): 25 TABLET, ORALLY DISINTEGRATING ORAL at 22:00

## 2022-12-23 RX ADMIN — Medication 325 MILLIGRAM(S): at 15:35

## 2022-12-23 RX ADMIN — SENNA PLUS 1 TABLET(S): 8.6 TABLET ORAL at 18:29

## 2022-12-23 RX ADMIN — Medication 30 MILLIGRAM(S): at 06:50

## 2022-12-23 RX ADMIN — LEVETIRACETAM 1000 MILLIGRAM(S): 250 TABLET, FILM COATED ORAL at 22:00

## 2022-12-23 RX ADMIN — HEPARIN SODIUM 5000 UNIT(S): 5000 INJECTION INTRAVENOUS; SUBCUTANEOUS at 18:28

## 2022-12-23 RX ADMIN — LEVETIRACETAM 1000 MILLIGRAM(S): 250 TABLET, FILM COATED ORAL at 09:22

## 2022-12-23 RX ADMIN — Medication 30 MILLIGRAM(S): at 13:00

## 2022-12-23 RX ADMIN — Medication 30 MILLIGRAM(S): at 18:29

## 2022-12-23 RX ADMIN — Medication 30 MILLIGRAM(S): at 05:50

## 2022-12-23 RX ADMIN — Medication 200 MILLIGRAM(S): at 12:39

## 2022-12-23 RX ADMIN — ERTAPENEM SODIUM 1000 MILLIGRAM(S): 1 INJECTION, POWDER, LYOPHILIZED, FOR SOLUTION INTRAMUSCULAR; INTRAVENOUS at 00:22

## 2022-12-23 RX ADMIN — Medication 30 MILLIGRAM(S): at 00:42

## 2022-12-23 NOTE — DISCHARGE NOTE OB - HOSPITAL COURSE
Pt admitted at 40+wks with IUGR fetus and nonreactive FHT with h/o prior c/s.  Pt declined Rpt C/s and desired induction.  She received a cervical balloon.  Pitocin was initiated.  At 4-5cm pt was noted to have vaginal bleeding c/w placental abruption.  She eventually agreed to having a repeat C/s, delivering a viable female infant with bloody amniotic fluid.  Uncomplicated postop course

## 2022-12-23 NOTE — DISCHARGE NOTE OB - WILL THE PATIENT ACCEPT THE PFIZER COVID-19 VACCINE IF ELIGIBLE AND IT IS AVAILABLE?
PT TREATMENT     09/15/18 8734   Pain Assessment   Pain Assessment 0-10   Pain Score 8  (R hip/buttock area)   Restrictions/Precautions   RLE Weight Bearing Per Order WBAT   Other Precautions Chair Alarm; Bed Alarm; Fall Risk;Pain  (patient with R quad weakness since surgery  2-/5)   General   Chart Reviewed Yes   Family/Caregiver Present No   Cognition   Arousal/Participation Cooperative   Subjective   Subjective patient anxious and fearful of falling    Bed Mobility   Supine to Sit 3  Moderate assistance   Additional items Assist x 1   Sit to Supine 3  Moderate assistance   Additional items Assist x 1   Transfers   Sit to Stand (min to mod assist)   Additional items Assist x 1;Verbal cues; Impulsive   Stand to Sit 4  Minimal assistance   Additional items Assist x 1;Verbal cues; Impulsive   Ambulation/Elevation   Gait Assistance 3  Moderate assist   Additional items Assist x 1;Verbal cues; Tactile cues   Assistive Device Rolling walker   Distance 5 feet x 2 to and from commode and required mod assist at times to prevent R knee from buckling due to continued quad weakness since surgery  Constant verbal cuing required for safe gait patterning and balance   Exercises   Quad Sets 15 reps; Supine;Right   Heelslides Supine;10 reps;Right   Hip Flexion Sitting;10 reps;AAROM; Right   Knee AROM Short Arc Quad (unable on R at this time)   Knee AROM Long Arc Quad (unable on R LE at this time due to quad weakness)   Ankle Pumps Supine;20 reps;Bilateral   Assessment   Assessment patient anxious and nervous with activity and gait and requires verbal cuing at all times  Patient continues with significant R quad weakness and R knee buckling at times, patient encouraged to complete quad sets independently and frequently  Patient will benefit from continued PT BID   Plan   Treatment/Interventions ADL retraining;Functional transfer training;LE strengthening/ROM; Elevations; Therapeutic exercise; Endurance training;Patient/family training;Equipment eval/education; Bed mobility;Gait training; Compensatory technique education   PT Frequency Twice a day   Recommendation   Recommendation (str versus home with services as function allows)   Licensure   [place] License Number  Osiris Adams PT [id number] No

## 2022-12-23 NOTE — DISCHARGE NOTE OB - PATIENT PORTAL LINK FT
You can access the FollowMyHealth Patient Portal offered by [facility] by registering at the following website: [email or web address]. By joining [facility]’s FollowMyHealth portal, you will also be able to view your health information using other applications (apps) compatible with our system.

## 2022-12-23 NOTE — DISCHARGE NOTE OB - MEDICATION SUMMARY - MEDICATIONS TO TAKE
I will START or STAY ON the medications listed below when I get home from the hospital:    acetaminophen 325 mg oral tablet  -- 3 tab(s) by mouth every 8 hours, As Needed  -- Indication: For  delivery    ibuprofen 600 mg oral tablet  -- 1 tab(s) by mouth every 6 hours, As Needed  -- Indication: For  delivery    ferrous sulfate 325 mg (65 mg elemental iron) oral tablet  -- 1 tab(s) by mouth once a day  -- Indication: For  delivery    Prenatal Multivitamins with Folic Acid 1 mg oral tablet  -- 1 tab(s) by mouth once a day  -- Indication: For  delivery

## 2022-12-23 NOTE — DISCHARGE NOTE OB - CARE PROVIDER_API CALL
Jody Marina (MD)  Obstetrics and Gynecology  [facility] [place] [address]  Phone: [phone]  Fax: [phone]  Follow Up Time:

## 2022-12-23 NOTE — DISCHARGE NOTE OB - NS MD DC FALL RISK RISK
For information on Fall & Injury Prevention, visit: [email or web address] OR  [email or web address] OR  https://www.cdc.gov/steadi/patient.html

## 2022-12-23 NOTE — DISCHARGE NOTE OB - CARE PLAN
Principal Discharge DX:	 delivery delivered  Assessment and plan of treatment:	Nothing per vagina and no heavy lifting for 6wks  Secondary Diagnosis:	Placental abruption, third trimester   1

## 2022-12-24 RX ORDER — OXYCODONE HYDROCHLORIDE 5 MG/1
5 TABLET ORAL
Refills: 0 | Status: DISCONTINUED | OUTPATIENT
Start: 2022-12-24 | End: 2022-12-25

## 2022-12-24 RX ORDER — CEPHALEXIN 500 MG
250 CAPSULE ORAL DAILY
Refills: 0 | Status: DISCONTINUED | OUTPATIENT
Start: 2022-12-24 | End: 2022-12-25

## 2022-12-24 RX ADMIN — Medication 1 TABLET(S): at 14:02

## 2022-12-24 RX ADMIN — SENNA PLUS 1 TABLET(S): 8.6 TABLET ORAL at 17:56

## 2022-12-24 RX ADMIN — OXYCODONE HYDROCHLORIDE 5 MILLIGRAM(S): 5 TABLET ORAL at 23:00

## 2022-12-24 RX ADMIN — Medication 200 MILLIGRAM(S): at 09:24

## 2022-12-24 RX ADMIN — Medication 600 MILLIGRAM(S): at 09:50

## 2022-12-24 RX ADMIN — Medication 600 MILLIGRAM(S): at 17:06

## 2022-12-24 RX ADMIN — HEPARIN SODIUM 5000 UNIT(S): 5000 INJECTION INTRAVENOUS; SUBCUTANEOUS at 09:24

## 2022-12-24 RX ADMIN — LEVETIRACETAM 1000 MILLIGRAM(S): 250 TABLET, FILM COATED ORAL at 09:25

## 2022-12-24 RX ADMIN — LAMOTRIGINE 200 MILLIGRAM(S): 25 TABLET, ORALLY DISINTEGRATING ORAL at 22:07

## 2022-12-24 RX ADMIN — OXYCODONE HYDROCHLORIDE 5 MILLIGRAM(S): 5 TABLET ORAL at 14:01

## 2022-12-24 RX ADMIN — Medication 200 MILLIGRAM(S): at 17:07

## 2022-12-24 RX ADMIN — LEVETIRACETAM 1000 MILLIGRAM(S): 250 TABLET, FILM COATED ORAL at 22:07

## 2022-12-24 RX ADMIN — OXYCODONE HYDROCHLORIDE 5 MILLIGRAM(S): 5 TABLET ORAL at 22:22

## 2022-12-24 RX ADMIN — HEPARIN SODIUM 5000 UNIT(S): 5000 INJECTION INTRAVENOUS; SUBCUTANEOUS at 22:07

## 2022-12-24 RX ADMIN — Medication 325 MILLIGRAM(S): at 14:02

## 2022-12-24 RX ADMIN — Medication 250 MILLIGRAM(S): at 22:06

## 2022-12-24 RX ADMIN — OXYCODONE HYDROCHLORIDE 5 MILLIGRAM(S): 5 TABLET ORAL at 14:25

## 2022-12-24 RX ADMIN — Medication 600 MILLIGRAM(S): at 09:24

## 2022-12-24 RX ADMIN — SIMETHICONE 80 MILLIGRAM(S): 80 TABLET, CHEWABLE ORAL at 14:02

## 2022-12-24 RX ADMIN — Medication 600 MILLIGRAM(S): at 02:39

## 2022-12-24 RX ADMIN — Medication 600 MILLIGRAM(S): at 03:00

## 2022-12-25 VITALS
SYSTOLIC BLOOD PRESSURE: 135 MMHG | TEMPERATURE: 98 F | RESPIRATION RATE: 18 BRPM | HEART RATE: 91 BPM | OXYGEN SATURATION: 100 % | DIASTOLIC BLOOD PRESSURE: 78 MMHG

## 2022-12-25 RX ADMIN — OXYCODONE HYDROCHLORIDE 5 MILLIGRAM(S): 5 TABLET ORAL at 03:45

## 2022-12-25 RX ADMIN — LEVETIRACETAM 1000 MILLIGRAM(S): 250 TABLET, FILM COATED ORAL at 09:42

## 2022-12-25 RX ADMIN — OXYCODONE HYDROCHLORIDE 5 MILLIGRAM(S): 5 TABLET ORAL at 03:15

## 2022-12-25 RX ADMIN — HEPARIN SODIUM 5000 UNIT(S): 5000 INJECTION INTRAVENOUS; SUBCUTANEOUS at 09:42

## 2022-12-25 RX ADMIN — Medication 600 MILLIGRAM(S): at 03:15

## 2022-12-25 RX ADMIN — SIMETHICONE 80 MILLIGRAM(S): 80 TABLET, CHEWABLE ORAL at 03:16

## 2022-12-25 RX ADMIN — Medication 600 MILLIGRAM(S): at 03:45

## 2022-12-25 RX ADMIN — Medication 600 MILLIGRAM(S): at 09:42

## 2022-12-25 RX ADMIN — Medication 600 MILLIGRAM(S): at 10:19

## 2022-12-25 NOTE — PROGRESS NOTE ADULT - SUBJECTIVE AND OBJECTIVE BOX
OB Postpartum Note:  Delivery, POD#3    S: 33yo POD#3 s/p rLTCS c/b Flu+, cHTN, and endometritis. Pain controlled. She is tolerating a regular diet and passing flatus. She is voiding spontaneously, and ambulating. Denies CP/SOB. Denies lightheadedness/dizziness. Denies N/V. Denies any fevers, chills, headaches, or scotomas. Does complain of pain over the dorsal aspect of her feet bilaterally but denies any calf pain b/l    O:  Vitals:  Vital Signs Last 24 Hrs  T(C): 36.3 (25 Dec 2022 06:00), Max: 36.8 (24 Dec 2022 13:30)  T(F): 97.4 (25 Dec 2022 06:00), Max: 98.3 (24 Dec 2022 13:30)  HR: 81 (25 Dec 2022 06:00) (79 - 88)  BP: 119/73 (25 Dec 2022 06:00) (116/59 - 124/69)  BP(mean): --  RR: 17 (25 Dec 2022 06:00) (16 - 17)  SpO2: 100% (25 Dec 2022 06:00) (99% - 100%)    Parameters below as of 25 Dec 2022 06:00  Patient On (Oxygen Delivery Method): room air        MEDICATIONS  (STANDING):  acetaminophen     Tablet .. 975 milliGRAM(s) Oral <User Schedule>  cephalexin 250 milliGRAM(s) Oral daily  diphtheria/tetanus/pertussis (acellular) Vaccine (Adacel) 0.5 milliLiter(s) IntraMuscular once  ferrous    sulfate 325 milliGRAM(s) Oral daily  heparin   Injectable 5000 Unit(s) SubCutaneous every 12 hours  ibuprofen  Tablet. 600 milliGRAM(s) Oral every 6 hours  influenza   Vaccine 0.5 milliLiter(s) IntraMuscular once  lamoTRIgine 200 milliGRAM(s) Oral daily  levETIRAcetam 1000 milliGRAM(s) Oral two times a day  prenatal multivitamin 1 Tablet(s) Oral daily  senna 1 Tablet(s) Oral two times a day    MEDICATIONS  (PRN):  diphenhydrAMINE 25 milliGRAM(s) Oral every 6 hours PRN Pruritus  guaiFENesin Oral Liquid (Sugar-Free) 200 milliGRAM(s) Oral every 6 hours PRN Cough  lanolin Ointment 1 Application(s) Topical every 6 hours PRN Sore Nipples  magnesium hydroxide Suspension 30 milliLiter(s) Oral two times a day PRN Constipation  oxyCODONE    IR 5 milliGRAM(s) Oral once PRN Moderate to Severe Pain (4-10)  oxyCODONE    IR 5 milliGRAM(s) Oral every 3 hours PRN Moderate to Severe Pain (4-10)  simethicone 80 milliGRAM(s) Chew every 4 hours PRN Gas      LABS:  Blood type: B Positive  Rubella IgG: RPR: Negative                          10.1<L>   11.22<H> >-----------< 203    (  @ 05:35 )             32.7<L>                        10.3<L>   5.79 >-----------< 171    (  @ 19:35 )             33.1<L>    22 @ 19:35      136  |  106  |  4<L>  ----------------------------<  73  4.0   |  19<L>  |  0.65        Ca    8.1<L>      22 Dec 2022 19:35    TPro  5.2<L>  /  Alb  2.7<L>  /  TBili  0.4  /  DBili  x   /  AST  16  /  ALT  7   /  AlkPhos  133<H>  22 @ 19:35          Physical exam:  Gen: NAD. Was sitting up in chair prior to eval   Pulm: Unlabored breathing. No respiratory distress  Abdomen: Soft. Appropriately tender. Non-distended. Firm fundus   Incision: Clean, dry, and intact   Ext: No calf tenderness bilaterally. 1+ pitting edema up to patella             
R1 Progress Note    Patient seen and examined at bedside, no acute overnight events. No acute complaints, pain well controlled. Patient is ambulating and tolerating regular diet. Has not yet passed flatus. Hicks catheter recently removed, has not yet voided. Denies CP, SOB, N/V, HA, blurred vision, epigastric pain.    Vital Signs Last 24 Hours  T(C): 36.9 (12-23-22 @ 05:45), Max: 38.2 (12-22-22 @ 23:15)  HR: 85 (12-23-22 @ 05:45) (72 - 113)  BP: 130/76 (12-23-22 @ 05:45) (101/57 - 156/71)  RR: 18 (12-23-22 @ 05:45) (18 - 23)  SpO2: 100% (12-23-22 @ 05:45) (82% - 100%)    I&O's Summary    22 Dec 2022 07:01  -  23 Dec 2022 07:00  --------------------------------------------------------  IN: 2825 mL / OUT: 2400 mL / NET: 425 mL        Physical Exam:  General: NAD  Abdomen: Soft, non-tender, non-distended, fundus firm  Incision: Pfannenstiel incision CDI, covered in dermabond  Pelvic: Lochia wnl    Labs:    Blood Type: B Positive  Antibody Screen: Negative  RPR: Negative               10.1   11.22 )-----------( 203      ( 12-23 @ 05:35 )             32.7                10.3   5.79  )-----------( 171      ( 12-22 @ 19:35 )             33.1                10.9   4.64  )-----------( 198      ( 12-21 @ 23:00 )             34.6         MEDICATIONS  (STANDING):  acetaminophen     Tablet .. 975 milliGRAM(s) Oral <User Schedule>  cephalexin 250 milliGRAM(s) Oral at bedtime  diphtheria/tetanus/pertussis (acellular) Vaccine (Adacel) 0.5 milliLiter(s) IntraMuscular once  ertapenem  IVPB      ertapenem  IVPB 1000 milliGRAM(s) IV Intermittent every 24 hours  heparin   Injectable 5000 Unit(s) SubCutaneous every 12 hours  ibuprofen  Tablet. 600 milliGRAM(s) Oral every 6 hours  influenza   Vaccine 0.5 milliLiter(s) IntraMuscular once  ketorolac   Injectable 30 milliGRAM(s) IV Push every 6 hours  lamoTRIgine 200 milliGRAM(s) Oral daily  levETIRAcetam 1000 milliGRAM(s) Oral two times a day    MEDICATIONS  (PRN):  diphenhydrAMINE 25 milliGRAM(s) Oral every 6 hours PRN Pruritus  guaiFENesin Oral Liquid (Sugar-Free) 200 milliGRAM(s) Oral every 6 hours PRN Cough  lanolin Ointment 1 Application(s) Topical every 6 hours PRN Sore Nipples  magnesium hydroxide Suspension 30 milliLiter(s) Oral two times a day PRN Constipation  oxyCODONE    IR 5 milliGRAM(s) Oral every 3 hours PRN Moderate to Severe Pain (4-10)  oxyCODONE    IR 5 milliGRAM(s) Oral once PRN Moderate to Severe Pain (4-10)  simethicone 80 milliGRAM(s) Chew every 4 hours PRN Gas  
Postop Day  1  s/p   C- Section    THERAPY:  [ X] Epidural morphine         Sedation Score:	  [ X] Alert	    [  ] Drowsy        [  ] Arousable	[  ] Asleep	[  ] Unresponsive    Side Effects:	  [ X] None	     [  ] Nausea        [  ] Pruritus        [  ] Weakness   [  ] Numbness      No headache. Able to ambulate and void. Tolerates diet.    ASSESSMENT/ PLAN   Change to po prn pain meds 24 hours post Epidural Morphine.  [ x ]Documentation and Verification of current medications   
R1 Progress Note    Patient seen and examined at bedside, no acute overnight events. No acute complaints, pain well controlled. Patient is ambulating and tolerating regular diet. Passing flatus, voiding spontaneously. Denies CP, SOB, N/V, HA, blurred vision, epigastric pain.    Vital Signs Last 24 Hours  T(C): 36.7 (12-24-22 @ 02:00), Max: 37.2 (12-23-22 @ 13:54)  HR: 89 (12-24-22 @ 02:00) (75 - 89)  BP: 121/61 (12-24-22 @ 02:00) (102/63 - 122/60)  RR: 18 (12-24-22 @ 02:00) (18 - 19)  SpO2: 100% (12-24-22 @ 02:00) (100% - 100%)    I&O's Summary    23 Dec 2022 07:01  -  24 Dec 2022 07:00  --------------------------------------------------------  IN: 0 mL / OUT: 900 mL / NET: -900 mL        Physical Exam:  General: NAD  Abdomen: Soft, non-tender, non-distended, fundus firm  Incision: Pfannenstiel incision CDI  Pelvic: Lochia wnl    Labs:    Blood Type: B Positive  Antibody Screen: Negative  RPR: Negative               10.1   11.22 )-----------( 203      ( 12-23 @ 05:35 )             32.7                10.3   5.79  )-----------( 171      ( 12-22 @ 19:35 )             33.1                10.9   4.64  )-----------( 198      ( 12-21 @ 23:00 )             34.6         MEDICATIONS  (STANDING):  acetaminophen     Tablet .. 975 milliGRAM(s) Oral <User Schedule>  cephalexin 250 milliGRAM(s) Oral daily  diphtheria/tetanus/pertussis (acellular) Vaccine (Adacel) 0.5 milliLiter(s) IntraMuscular once  ferrous    sulfate 325 milliGRAM(s) Oral daily  heparin   Injectable 5000 Unit(s) SubCutaneous every 12 hours  ibuprofen  Tablet. 600 milliGRAM(s) Oral every 6 hours  influenza   Vaccine 0.5 milliLiter(s) IntraMuscular once  lamoTRIgine 200 milliGRAM(s) Oral daily  levETIRAcetam 1000 milliGRAM(s) Oral two times a day  prenatal multivitamin 1 Tablet(s) Oral daily  senna 1 Tablet(s) Oral two times a day    MEDICATIONS  (PRN):  diphenhydrAMINE 25 milliGRAM(s) Oral every 6 hours PRN Pruritus  guaiFENesin Oral Liquid (Sugar-Free) 200 milliGRAM(s) Oral every 6 hours PRN Cough  lanolin Ointment 1 Application(s) Topical every 6 hours PRN Sore Nipples  magnesium hydroxide Suspension 30 milliLiter(s) Oral two times a day PRN Constipation  oxyCODONE    IR 5 milliGRAM(s) Oral every 3 hours PRN Moderate to Severe Pain (4-10)  oxyCODONE    IR 5 milliGRAM(s) Oral once PRN Moderate to Severe Pain (4-10)  simethicone 80 milliGRAM(s) Chew every 4 hours PRN Gas

## 2022-12-25 NOTE — PROGRESS NOTE ADULT - ASSESSMENT
31y/o POD#1 from rLTCS, , c/b postpartum temperature of 38.2C(12/22@11:25p). PMH significant for seizure disorder and cHTN. Patient is stable and progressing appropriately postpartum.      #Hx of seizures  - On home Keppra and Lamictal    #cHTN  - BP overnight: 120-140s/60-80s  - Not on any standing antihypertensives at this time  - Denies symptoms of PEC at this time    #Postpartum fever  - s/p Invanz  - Afebrile since    #Postpartum  - Continue with po analgesia  - Increase ambulation  - Continue regular diet  - AM H/H: 10.3/33.1->10.1/32.7, stable  - Continue HSQ for DVT ppx    Unique Wiley, PGY1  
A/P: 33yo POD#3 s/p rLTCS c/b Flu+, cHTN, and endometritis. Patient is progressing appropriately post-operatively  - Continue motrin, tylenol, oxycodone PRN for pain control.  - Continue to encourage ambulation  - Continue regular diet    #Seizure d/o  - Ordered for home Keppra and Lamictal    #cHTN  - BPs 110s/50-70s  - Not on any antihypertensives  - Denies any s/s of PEC     #Chronic UTI  - Ordered for home Cephalexin    #Endometritis   - s/p Ertapanem x2  - Afebrile     #Flu  - Patient saturating well on JENNY Dawn, PGY-1  Obstetrics and Gynecology
31y/o POD#1 from rLTCS, , c/b postpartum temperature of 38.2C(12/22@11:25p). PMH significant for seizure disorder, cHTN, and chronic UTI. Patient is stable and progressing appropriately postpartum.      #Hx of seizures  - On home Keppra and Lamictal  - Denies recent seizure activity    #cHTN  - BP overnight: 120s/60s  - Not on any standing antihypertensives at this time  - Denies symptoms of PEC at this time    #Chronic UTI  - On home Keflex regimen    #Postpartum fever  - s/p Invanz (12/22-12/23)  - Afebrile since    #Postpartum  - Continue with po analgesia  - Increase ambulation  - Continue regular diet  - Continue HSQ for DVT ppx    Unique Wiley, PGY1

## 2022-12-25 NOTE — PROGRESS NOTE ADULT - ATTENDING COMMENTS
Patient doing well. Ambulating, pain controlled. Passing some flatus. C/o cough, +flu, declined tamiflu. Continue routine postoperative care
saw and examined patient  doing well  for discharge today  reviewed discharge instructions
saw and examined patient   doing well  BPs stable

## 2022-12-27 ENCOUNTER — APPOINTMENT (OUTPATIENT)
Dept: ANTEPARTUM | Facility: CLINIC | Age: 32
End: 2022-12-27

## 2022-12-28 DIAGNOSIS — O36.5930 MATERNAL CARE FOR OTHER KNOWN OR SUSPECTED POOR FETAL GROWTH, THIRD TRIMESTER, NOT APPLICABLE OR UNSPECIFIED: ICD-10-CM

## 2022-12-28 DIAGNOSIS — O26.893 OTHER SPECIFIED PREGNANCY RELATED CONDITIONS, THIRD TRIMESTER: ICD-10-CM

## 2022-12-28 DIAGNOSIS — G40.909 EPILEPSY, UNSPECIFIED, NOT INTRACTABLE, WITHOUT STATUS EPILEPTICUS: ICD-10-CM

## 2022-12-28 DIAGNOSIS — Z28.310 UNVACCINATED FOR COVID-19: ICD-10-CM

## 2022-12-28 DIAGNOSIS — O99.353 DISEASES OF THE NERVOUS SYSTEM COMPLICATING PREGNANCY, THIRD TRIMESTER: ICD-10-CM

## 2022-12-28 DIAGNOSIS — R05.9 COUGH, UNSPECIFIED: ICD-10-CM

## 2022-12-28 DIAGNOSIS — O10.913 UNSPECIFIED PRE-EXISTING HYPERTENSION COMPLICATING PREGNANCY, THIRD TRIMESTER: ICD-10-CM

## 2022-12-28 DIAGNOSIS — Z20.822 CONTACT WITH AND (SUSPECTED) EXPOSURE TO COVID-19: ICD-10-CM

## 2022-12-28 DIAGNOSIS — O34.219 MATERNAL CARE FOR UNSPECIFIED TYPE SCAR FROM PREVIOUS CESAREAN DELIVERY: ICD-10-CM

## 2022-12-28 DIAGNOSIS — Z87.59 PERSONAL HISTORY OF OTHER COMPLICATIONS OF PREGNANCY, CHILDBIRTH AND THE PUERPERIUM: ICD-10-CM

## 2022-12-28 DIAGNOSIS — Z3A.40 40 WEEKS GESTATION OF PREGNANCY: ICD-10-CM

## 2022-12-30 ENCOUNTER — APPOINTMENT (OUTPATIENT)
Dept: ANTEPARTUM | Facility: CLINIC | Age: 32
End: 2022-12-30

## 2023-01-13 LAB — SURGICAL PATHOLOGY STUDY: SIGNIFICANT CHANGE UP

## 2023-02-02 ENCOUNTER — APPOINTMENT (OUTPATIENT)
Dept: OBGYN | Facility: CLINIC | Age: 33
End: 2023-02-02
Payer: MEDICAID

## 2023-02-02 VITALS
WEIGHT: 182 LBS | HEART RATE: 64 BPM | HEIGHT: 62 IN | BODY MASS INDEX: 33.49 KG/M2 | SYSTOLIC BLOOD PRESSURE: 130 MMHG | DIASTOLIC BLOOD PRESSURE: 86 MMHG

## 2023-02-02 PROCEDURE — 0503F POSTPARTUM CARE VISIT: CPT

## 2023-02-06 LAB — BACTERIA UR CULT: NORMAL

## 2023-02-06 NOTE — HISTORY OF PRESENT ILLNESS
[Delivery Date: ___] : on [unfilled] [Female] : Delivery History: baby girl [Girl] : baby is a girl [Infant's Name ___] : [unfilled] [___ Lbs] : [unfilled] lbs [___ Oz] : [unfilled] oz [Living at Home] : is currently living at home [Postpartum Follow Up] : postpartum follow up [Breastfeeding] : currently nursing [Clean/Dry/Intact] : clean, dry and intact [Back to Normal] : is back to normal in size [Mild] : mild vaginal bleeding [Normal] : the vagina was normal [Examination Of The Breasts] : breasts are normal [Doing Well] : is doing well [No Sign of Infection] : is showing no signs of infection [Excellent Pain Control] : has excellent pain control [Complications:___] : no complications [Repeat C/S] : delivered by  section (repeat) [BTL] : no tubal ligation [BF with Difficulty] : nursing without difficulty [Resumed Menses] : has not resumed her menses [Resumed [place]] : has not resumed intercourse [Intended Contraception] : the patient does not intended to use contraception postpartum [Breast Pain] : no breast pain [None] : No associated symptoms are reported [Erythema] : not erythematous [Soft] : soft [Tender] : non tender [Distended] : not distended [FreeTextEntry8] : Feels well. Pt met with urologist due to recurring UTI during pregnancy. Urologist found nothing remarkable.  [de-identified] : Failed /abruption [de-identified] : Advised pt to f/up with neurologist for h/o seizures. UCx today. RTO 6-9 months for annual visit.

## 2023-06-05 ENCOUNTER — NON-APPOINTMENT (OUTPATIENT)
Age: 33
End: 2023-06-05

## 2023-06-05 ENCOUNTER — APPOINTMENT (OUTPATIENT)
Dept: OPHTHALMOLOGY | Facility: CLINIC | Age: 33
End: 2023-06-05
Payer: MEDICAID

## 2023-06-05 ENCOUNTER — APPOINTMENT (OUTPATIENT)
Dept: OPHTHALMOLOGY | Facility: CLINIC | Age: 33
End: 2023-06-05

## 2023-06-05 PROCEDURE — 92133 CPTRZD OPH DX IMG PST SGM ON: CPT

## 2023-06-05 PROCEDURE — 92014 COMPRE OPH EXAM EST PT 1/>: CPT

## 2023-09-26 NOTE — PACU DISCHARGE NOTE - NSCLINEINSERTRD_GEN_ALL_CORE
Kenyatta Alvarado LPN     No

## 2023-12-19 ENCOUNTER — APPOINTMENT (OUTPATIENT)
Dept: OPHTHALMOLOGY | Facility: CLINIC | Age: 33
End: 2023-12-19
Payer: COMMERCIAL

## 2023-12-19 ENCOUNTER — NON-APPOINTMENT (OUTPATIENT)
Age: 33
End: 2023-12-19

## 2023-12-19 PROCEDURE — 92015 DETERMINE REFRACTIVE STATE: CPT | Mod: NC

## 2023-12-19 PROCEDURE — 92012 INTRM OPH EXAM EST PATIENT: CPT

## 2024-10-16 ENCOUNTER — APPOINTMENT (OUTPATIENT)
Dept: OBGYN | Facility: CLINIC | Age: 34
End: 2024-10-16
Payer: COMMERCIAL

## 2024-10-16 VITALS
HEIGHT: 62 IN | DIASTOLIC BLOOD PRESSURE: 72 MMHG | WEIGHT: 220 LBS | HEART RATE: 69 BPM | BODY MASS INDEX: 40.48 KG/M2 | SYSTOLIC BLOOD PRESSURE: 136 MMHG

## 2024-10-16 DIAGNOSIS — Z01.419 ENCOUNTER FOR GYNECOLOGICAL EXAMINATION (GENERAL) (ROUTINE) W/OUT ABNORMAL FINDINGS: ICD-10-CM

## 2024-10-16 DIAGNOSIS — N89.8 OTHER SPECIFIED NONINFLAMMATORY DISORDERS OF VAGINA: ICD-10-CM

## 2024-10-16 DIAGNOSIS — N92.0 EXCESSIVE AND FREQUENT MENSTRUATION WITH REGULAR CYCLE: ICD-10-CM

## 2024-10-16 PROCEDURE — 99395 PREV VISIT EST AGE 18-39: CPT

## 2024-10-16 PROCEDURE — 99459 PELVIC EXAMINATION: CPT

## 2024-10-16 PROCEDURE — 96127 BRIEF EMOTIONAL/BEHAV ASSMT: CPT

## 2024-10-16 RX ORDER — IBUPROFEN 600 MG/1
600 TABLET, FILM COATED ORAL 3 TIMES DAILY
Qty: 24 | Refills: 2 | Status: ACTIVE | COMMUNITY
Start: 2024-10-16 | End: 1900-01-01

## 2024-10-19 LAB — HPV HIGH+LOW RISK DNA PNL CVX: NOT DETECTED

## 2024-10-22 LAB
A VAGINAE DNA VAG QL NAA+PROBE: NORMAL
BVAB2 DNA VAG QL NAA+PROBE: NORMAL
C KRUSEI DNA VAG QL NAA+PROBE: NEGATIVE
C TRACH RRNA SPEC QL NAA+PROBE: NEGATIVE
CANDIDA DNA VAG QL NAA+PROBE: NEGATIVE
CYTOLOGY CVX/VAG DOC THIN PREP: NORMAL
MEGA1 DNA VAG QL NAA+PROBE: NORMAL
N GONORRHOEA RRNA SPEC QL NAA+PROBE: NEGATIVE
T VAGINALIS RRNA SPEC QL NAA+PROBE: NEGATIVE

## 2024-10-30 ENCOUNTER — APPOINTMENT (OUTPATIENT)
Dept: OBGYN | Facility: CLINIC | Age: 34
End: 2024-10-30
Payer: COMMERCIAL

## 2024-10-30 PROCEDURE — 76830 TRANSVAGINAL US NON-OB: CPT

## 2025-03-27 ENCOUNTER — NON-APPOINTMENT (OUTPATIENT)
Age: 35
End: 2025-03-27

## 2025-03-27 ENCOUNTER — APPOINTMENT (OUTPATIENT)
Dept: OPHTHALMOLOGY | Facility: CLINIC | Age: 35
End: 2025-03-27
Payer: COMMERCIAL

## 2025-03-27 ENCOUNTER — APPOINTMENT (OUTPATIENT)
Dept: OPHTHALMOLOGY | Facility: CLINIC | Age: 35
End: 2025-03-27

## 2025-03-27 PROCEDURE — 92083 EXTENDED VISUAL FIELD XM: CPT

## 2025-03-27 PROCEDURE — 92133 CPTRZD OPH DX IMG PST SGM ON: CPT

## 2025-03-27 PROCEDURE — 92015 DETERMINE REFRACTIVE STATE: CPT

## 2025-03-27 PROCEDURE — 92014 COMPRE OPH EXAM EST PT 1/>: CPT

## (undated) DEVICE — DRSG DERMABOND PRINEO 22CM